# Patient Record
Sex: MALE | Race: WHITE | Employment: PART TIME | URBAN - METROPOLITAN AREA
[De-identification: names, ages, dates, MRNs, and addresses within clinical notes are randomized per-mention and may not be internally consistent; named-entity substitution may affect disease eponyms.]

---

## 2017-04-18 ENCOUNTER — HOSPITAL ENCOUNTER (OUTPATIENT)
Dept: LAB | Age: 62
Discharge: HOME | End: 2017-04-18
Attending: INTERNAL MEDICINE
Payer: COMMERCIAL

## 2017-04-18 DIAGNOSIS — I25.110 CORONARY ARTERY DISEASE INVOLVING NATIVE CORONARY ARTERY OF NATIVE HEART WITH UNSTABLE ANGINA PECTORIS (HCC): Chronic | ICD-10-CM

## 2017-04-18 LAB
ANION GAP BLD CALC-SCNC: 5 MMOL/L
BASOPHILS # BLD AUTO: 0 K/UL (ref 0–0.2)
BASOPHILS # BLD: 0 % (ref 0–2)
BUN SERPL-MCNC: 19 MG/DL (ref 8–23)
CALCIUM SERPL-MCNC: 8.9 MG/DL (ref 8.3–10.4)
CHLORIDE SERPL-SCNC: 105 MMOL/L (ref 98–107)
CO2 SERPL-SCNC: 32 MMOL/L (ref 21–32)
CREAT SERPL-MCNC: 1.2 MG/DL (ref 0.8–1.5)
DIFFERENTIAL METHOD BLD: ABNORMAL
EOSINOPHIL # BLD: 0.1 K/UL (ref 0–0.8)
EOSINOPHIL NFR BLD: 2 % (ref 0.5–7.8)
ERYTHROCYTE [DISTWIDTH] IN BLOOD BY AUTOMATED COUNT: 12.7 % (ref 11.9–14.6)
GLUCOSE SERPL-MCNC: 94 MG/DL (ref 65–100)
HCT VFR BLD AUTO: 40.9 % (ref 41.1–50.3)
HGB BLD-MCNC: 13.5 G/DL (ref 13.6–17.2)
LYMPHOCYTES # BLD AUTO: 23 % (ref 13–44)
LYMPHOCYTES # BLD: 1.4 K/UL (ref 0.5–4.6)
MCH RBC QN AUTO: 30.4 PG (ref 26.1–32.9)
MCHC RBC AUTO-ENTMCNC: 33 G/DL (ref 31.4–35)
MCV RBC AUTO: 92.1 FL (ref 79.6–97.8)
MONOCYTES # BLD: 0.6 K/UL (ref 0.1–1.3)
MONOCYTES NFR BLD AUTO: 9 % (ref 4–12)
NEUTS SEG # BLD: 4 K/UL (ref 1.7–8.2)
NEUTS SEG NFR BLD AUTO: 66 % (ref 43–78)
PLATELET # BLD AUTO: 152 K/UL (ref 150–450)
PMV BLD AUTO: 9.5 FL (ref 10.8–14.1)
POTASSIUM SERPL-SCNC: 4.2 MMOL/L (ref 3.5–5.1)
RBC # BLD AUTO: 4.44 M/UL (ref 4.23–5.67)
SODIUM SERPL-SCNC: 142 MMOL/L (ref 136–145)
WBC # BLD AUTO: 6.1 K/UL (ref 4.3–11.1)

## 2017-04-18 PROCEDURE — 36415 COLL VENOUS BLD VENIPUNCTURE: CPT | Performed by: INTERNAL MEDICINE

## 2017-04-18 PROCEDURE — 85025 COMPLETE CBC W/AUTO DIFF WBC: CPT | Performed by: INTERNAL MEDICINE

## 2017-04-18 PROCEDURE — 80048 BASIC METABOLIC PNL TOTAL CA: CPT | Performed by: INTERNAL MEDICINE

## 2017-04-26 NOTE — PROGRESS NOTES
Patient pre-assessment complete for [facility] poss with DR Nicole Fabian scheduled for 17 at 8am, arrival time 6am. Patient verified using . Patient instructed to bring all home medications in labeled bottles on the day of procedure. NPO status reinforced. Patient informed to take a full dose aspirin 325mg  or 81 mg x 4 & Effient 10 mg on the day of procedure. Instructed they can take all other medications excluding vitamins & supplements. Patient verbalizes understanding of all instructions & denies any questions at this time.

## 2017-04-27 ENCOUNTER — HOSPITAL ENCOUNTER (OUTPATIENT)
Dept: CARDIAC CATH/INVASIVE PROCEDURES | Age: 62
Discharge: HOME | End: 2017-04-27
Attending: INTERNAL MEDICINE | Admitting: INTERNAL MEDICINE
Payer: COMMERCIAL

## 2017-04-27 VITALS
WEIGHT: 220 LBS | OXYGEN SATURATION: 96 % | HEART RATE: 63 BPM | RESPIRATION RATE: 16 BRPM | SYSTOLIC BLOOD PRESSURE: 116 MMHG | TEMPERATURE: 98.4 F | BODY MASS INDEX: 29.8 KG/M2 | DIASTOLIC BLOOD PRESSURE: 59 MMHG | HEIGHT: 72 IN

## 2017-04-27 PROCEDURE — 99152 MOD SED SAME PHYS/QHP 5/>YRS: CPT

## 2017-04-27 PROCEDURE — C1894 INTRO/SHEATH, NON-LASER: HCPCS

## 2017-04-27 PROCEDURE — 77030004559 HC CATH ANGI DX SUPT CARD -B

## 2017-04-27 PROCEDURE — 74011250636 HC RX REV CODE- 250/636: Performed by: INTERNAL MEDICINE

## 2017-04-27 PROCEDURE — 77030029997 HC DEV COM RDL R BND TELE -B

## 2017-04-27 PROCEDURE — 93458 L HRT ARTERY/VENTRICLE ANGIO: CPT

## 2017-04-27 PROCEDURE — 74011250636 HC RX REV CODE- 250/636

## 2017-04-27 PROCEDURE — 74011000250 HC RX REV CODE- 250: Performed by: INTERNAL MEDICINE

## 2017-04-27 PROCEDURE — 74011636320 HC RX REV CODE- 636/320: Performed by: INTERNAL MEDICINE

## 2017-04-27 PROCEDURE — 99153 MOD SED SAME PHYS/QHP EA: CPT

## 2017-04-27 PROCEDURE — C1769 GUIDE WIRE: HCPCS

## 2017-04-27 RX ORDER — LIDOCAINE HYDROCHLORIDE 20 MG/ML
1-20 INJECTION, SOLUTION INFILTRATION; PERINEURAL
Status: DISCONTINUED | OUTPATIENT
Start: 2017-04-27 | End: 2017-04-27 | Stop reason: HOSPADM

## 2017-04-27 RX ORDER — GUAIFENESIN 100 MG/5ML
81-324 LIQUID (ML) ORAL ONCE
Status: DISCONTINUED | OUTPATIENT
Start: 2017-04-27 | End: 2017-04-27 | Stop reason: HOSPADM

## 2017-04-27 RX ORDER — FENTANYL CITRATE 50 UG/ML
25-100 INJECTION, SOLUTION INTRAMUSCULAR; INTRAVENOUS
Status: DISCONTINUED | OUTPATIENT
Start: 2017-04-27 | End: 2017-04-27 | Stop reason: HOSPADM

## 2017-04-27 RX ORDER — SODIUM CHLORIDE 9 MG/ML
75 INJECTION, SOLUTION INTRAVENOUS CONTINUOUS
Status: DISCONTINUED | OUTPATIENT
Start: 2017-04-27 | End: 2017-04-27 | Stop reason: HOSPADM

## 2017-04-27 RX ORDER — MIDAZOLAM HYDROCHLORIDE 1 MG/ML
.5-5 INJECTION, SOLUTION INTRAMUSCULAR; INTRAVENOUS
Status: DISCONTINUED | OUTPATIENT
Start: 2017-04-27 | End: 2017-04-27 | Stop reason: HOSPADM

## 2017-04-27 RX ORDER — METOPROLOL SUCCINATE 50 MG/1
100 TABLET, EXTENDED RELEASE ORAL DAILY
Qty: 30 TAB | Refills: 5 | Status: SHIPPED | OUTPATIENT
Start: 2017-04-27 | End: 2017-05-10

## 2017-04-27 RX ORDER — HEPARIN SODIUM 200 [USP'U]/100ML
3 INJECTION, SOLUTION INTRAVENOUS CONTINUOUS
Status: DISCONTINUED | OUTPATIENT
Start: 2017-04-27 | End: 2017-04-27 | Stop reason: HOSPADM

## 2017-04-27 RX ORDER — DIAZEPAM 5 MG/1
5 TABLET ORAL ONCE
Status: DISCONTINUED | OUTPATIENT
Start: 2017-04-27 | End: 2017-04-27 | Stop reason: HOSPADM

## 2017-04-27 RX ADMIN — LIDOCAINE HYDROCHLORIDE 60 MG: 20 INJECTION, SOLUTION INFILTRATION; PERINEURAL at 08:58

## 2017-04-27 RX ADMIN — SODIUM CHLORIDE 75 ML/HR: 900 INJECTION, SOLUTION INTRAVENOUS at 07:13

## 2017-04-27 RX ADMIN — MIDAZOLAM HYDROCHLORIDE 2 MG: 1 INJECTION, SOLUTION INTRAMUSCULAR; INTRAVENOUS at 08:57

## 2017-04-27 RX ADMIN — IOPAMIDOL 105 ML: 755 INJECTION, SOLUTION INTRAVENOUS at 09:16

## 2017-04-27 RX ADMIN — HEPARIN SODIUM 3 ML/HR: 200 INJECTION, SOLUTION INTRAVENOUS at 08:07

## 2017-04-27 RX ADMIN — HEPARIN SODIUM 2 ML: 10000 INJECTION, SOLUTION INTRAVENOUS; SUBCUTANEOUS at 09:00

## 2017-04-27 NOTE — PROGRESS NOTES
Patient received to [address] [place] [address] room # 3  Ambulatory from [facility]. Patient scheduled for [facility] today with Dr Mikel Wharton. Procedure reviewed & questions answered, voiced good understanding consent obtained & placed on chart. All medications and medical history reviewed. Will prep patient per orders. Patient & family updated on plan of care.

## 2017-04-27 NOTE — PROCEDURES
Brief Cardiac Procedure Note    Patient: Aaron Lindsay MRN: [id number]  SSN: [id number]    YOB: 1955  Age: 64 y.o. Sex: male      Date of Procedure: 4/27/2017     Pre-procedure Diagnosis: Chest pain CCS Class III    Post-procedure Diagnosis: Coronary Artery Disease    Procedure: Left Heart Catheterization    Brief Description of Procedure: viarra    Performed By: Skyla Mosley MD     Assistants:     Anesthesia: Moderate Sedation    Estimated Blood Loss: Less than 10 mL      Specimens: None    Implants: None    Findings: nml lv, cors ok    Complications: None    Recommendations: Continue medical therapy.     Signed By: Skyla Mosley MD     April 27, 2017

## 2017-04-27 NOTE — PROGRESS NOTES
TRANSFER - OUT REPORT:    Verbal report given to del rn(name) on [place] Blocker  being transferred to cpru(unit) for routine progression of care       Report consisted of patients Situation, Background, Assessment and   Recommendations(SBAR). Information from the following report(s) SBAR was reviewed with the receiving nurse. Lines:   Peripheral IV 04/27/17 Right Antecubital (Active)       Peripheral IV 04/27/17 Left Antecubital (Active)        Opportunity for questions and clarification was provided.       Patient transported with:   Luke Lamar  No intervention  Right wrist r band 10ml  No bleeding or hematoma  Versed 2mg

## 2017-04-27 NOTE — IP AVS SNAPSHOT
[address] 
[phone] Patient: Gi Aguilar MRN: [id number] DCA:27/46/7462 Discharge Summary 4/27/2017 Gi Aguilar MRN[de-identified]  [id number] Admission Information Provider Pager Service Admission Date Expected D/C Date Kimberlee Mccarthy MD  CARDIAC CATH LAB 4/27/2017 4/27/2017 Actual LOS Patient Class 0 days OUTPATIENT Follow-up Information Follow up With Details Comments Contact Info Eleni Daniel MD    [place]  
[address] [facility] [address] 
[phone] Current Discharge Medication List  
  
CONTINUE these medications which have CHANGED Dose & Instructions Dispensing Information Comments Morning Noon Evening Bedtime  
 metoprolol succinate 50 mg XL tablet Commonly known as:  TOPROL-XL What changed:  how much to take Your last dose was: Your next dose is:    
   
   
 Dose:  100 mg Take 2 Tabs by mouth daily. Indications: hypertension Quantity:  30 Tab Refills:  5 CONTINUE these medications which have NOT CHANGED Dose & Instructions Dispensing Information Comments Morning Noon Evening Bedtime  
 aspirin delayed-release 81 mg tablet Your last dose was: Your next dose is:    
   
   
 Dose:  81 mg Take 1 Tab by mouth daily. Quantity:  30 Tab Refills:  3  
     
   
   
   
  
 citalopram 40 mg tablet Commonly known as:  Nakul Dumont Your last dose was: Your next dose is:    
   
   
 Dose:  40 mg Take 1 Tab by mouth daily. Indications: ANXIETY WITH DEPRESSION Quantity:  30 Tab Refills:  5  
     
   
   
   
  
 fexofenadine 180 mg tablet Commonly known as:  Marianna Lopez Your last dose was: Your next dose is: Take  by mouth as needed. Refills:  0 Lisdexamfetamine 40 mg capsule Commonly known as:  VYVANSE Your last dose was: Your next dose is:    
   
   
 Dose:  40 mg Take 1 Cap by mouth daily. Max Daily Amount: 40 mg. Indications: ATTENTION-DEFICIT HYPERACTIVITY DISORDER Quantity:  30 Cap Refills:  0  
     
   
   
   
  
 lisinopril 20 mg tablet Commonly known as:  Nona Alegria Your last dose was: Your next dose is:    
   
   
 Dose:  20 mg Take 1 Tab by mouth daily. Indications: HYPERTENSION Quantity:  30 Tab Refills:  5  
 lisinopril refill(s) sent with 1 month supply only. niacin 1,000 mg Tb24 tab Commonly known as:  Willena [place] Your last dose was: Your next dose is:    
   
   
 Dose:  2000 mg Take 2 Tabs by mouth nightly. Quantity:  60 Tab Refills:  5  
     
   
   
   
  
 pantoprazole 40 mg tablet Commonly known as:  PROTONIX Your last dose was: Your next dose is:    
   
   
 Dose:  40 mg Take 1 Tab by mouth daily. Quantity:  30 Tab Refills:  5  
     
   
   
   
  
 prasugrel 10 mg tablet Commonly known as:  EFFIENT Your last dose was: Your next dose is:    
   
   
 Dose:  10 mg Take 1 Tab by mouth daily. Quantity:  30 Tab Refills:  11  
     
   
   
   
  
 rosuvastatin 20 mg tablet Commonly known as:  CRESTOR Your last dose was: Your next dose is:    
   
   
 Dose:  20 mg Take 1 Tab by mouth daily. Quantity:  30 Tab Refills:  5  
     
   
   
   
  
 sildenafil citrate 100 mg tablet Commonly known as:  VIAGRA Your last dose was: Your next dose is:    
   
   
 Dose:  100 mg Take 1 Tab by mouth as needed. Quantity:  10 Tab Refills:  11  
     
   
   
   
  
 tamsulosin 0.4 mg capsule Commonly known as:  FLOMAX Your last dose was: Your next dose is:    
   
   
 Dose:  0.4 mg Take 1 Cap by mouth daily. Quantity:  30 Cap Refills:  5 VITAMIN D3 PO Your last dose was: Your next dose is: Take  by mouth daily. Refills:  0 Where to Get Your Medications These medications [facility], [address] [place] [address] [address], [address] [place] [address] [address] [address] Phone:  [phone]  
  metoprolol succinate 50 mg XL tablet General Information Please provide this summary of care documentation to your next provider. Allergies Unspecified:  Codeine Current Immunizations  Never Reviewed Name Date Influenza Vaccine 11/3/2015 Pneumococcal Vaccine (Unspecified Type) 11/3/2015 Discharge Instructions Discharge Instructions HEART CATHETERIZATION/ANGIOGRAPHY DISCHARGE INSTRUCTIONS Follow-up appointment: May 10th @ 9:30am with Dr. Anselmo Huizar You are to increase your Toprol from 25mg to 50mg 1. Check puncture site frequently for swelling or bleeding. If there is any bleeding, lie down and apply pressure over the area with a clean towel or washcloth. Notify your doctor for any redness, swelling, drainage, or oozing from the puncture site. Notify your doctor for any fever or chills. 2. If the extremity becomes cold, numb, or painful call [address] [facility] [address] [address] Cardiology at [phone]. 
3. Activity should be limited for the next 48 hours. Climb stairs as little as possible and avoid any stooping, bending, or strenuous activity for 48 hours. No heavy lifting (anything over 10 pounds) for 3 days. 4. You may resume your usual diet. Drink more fluids than usual. 
5. Have a responsible person drive you home and stay with you for at least 24 hours after your heart catheterization/angiography. 6. You may remove bandage from your {ARM/GROIN:00202} in 24 hours. You may shower in 24 hours. No tub baths, hot tubs, or swimming for 1 week.  Do not place any lotions, creams, powders, or ointments over puncture site for 1 week. You may place a clean band-aid over the puncture site each day for 5 days. Change daily. I have read the above instructions and have had the opportunity to ask questions. Patient: ________________________   Date: 4/27/2017 Witness: _______________________   Date: 4/27/2017 Discharge Orders None  
  
` Patient Signature:  ____________________________________________________________ Date:  ____________________________________________________________  
  
 Neita Hidden Provider Signature:  ____________________________________________________________ Date:  ____________________________________________________________

## 2017-04-27 NOTE — DISCHARGE INSTRUCTIONS
HEART CATHETERIZATION/ANGIOGRAPHY DISCHARGE INSTRUCTIONS  Follow-up appointment: May 10th @ 9:30am with Dr. Martina Corrales are to increase your Toprol from 25mg to 50mg    1. Check puncture site frequently for swelling or bleeding. If there is any bleeding, lie down and apply pressure over the area with a clean towel or washcloth. Notify your doctor for any redness, swelling, drainage, or oozing from the puncture site. Notify your doctor for any fever or chills. 2. If the extremity becomes cold, numb, or painful call [facility] Cardiology at [phone].  3. Activity should be limited for the next 48 hours. Climb stairs as little as possible and avoid any stooping, bending, or strenuous activity for 48 hours. No heavy lifting (anything over 10 pounds) for 3 days. 4. You may resume your usual diet. Drink more fluids than usual.  5. Have a responsible person drive you home and stay with you for at least 24 hours after your heart catheterization/angiography. 6. You may remove bandage from your {ARM/GROIN:31096} in 24 hours. You may shower in 24 hours. No tub baths, hot tubs, or swimming for 1 week. Do not place any lotions, creams, powders, or ointments over puncture site for 1 week. You may place a clean band-aid over the puncture site each day for 5 days. Change daily. I have read the above instructions and have had the opportunity to ask questions.       Patient: ________________________   Date: 4/27/2017    Witness: _______________________   Date: 4/27/2017

## 2017-04-27 NOTE — PROGRESS NOTES
Report received from [address] [place] [address]. Procedural findings communicated. Intra procedural  medication administration reviewed. Progression of care discussed.      Patient received into [address] [facility] 4 post sheath removal.     Access site without bleeding or swelling yes    Dressing dry and intact yes    Patient instructed to limit movement to right upper extremity    Routine post procedural vital signs and site assessment initiated yes

## 2017-04-27 NOTE — PROGRESS NOTES
R band deflation completed. Right radial dressed with 4x4 and tegaderm using sterile technique. No bleeding or hematoma. Tolerated without difficulty. Discharge instructions given. Right wrist dsg intact with wrist site w/o bleeding or swelling.

## 2017-04-27 NOTE — PROCEDURES
[address]       Name:  Raheem Bonilla   MR#:  [id number]   :  1955   Account #:  [de-identified]   Date of Adm:  2017       DATE OF PROCEDURE: 2017    PROCEDURE PERFORMED: Cardiac catheterization. HISTORY: This is a middle-aged gentleman with hypertrophic   cardiomyopathy and coronary artery disease. He has undergone   prior alcohol septal ablation and coronary artery stenting. He   has had recurrent [place] class 3 angina pectoris. A cardiac   catheterization is recommended. PROCEDURE: Left heart catheterization, left ventriculography,   and coronary angiography is carried out from the right radial   artery by modified Seldinger technique with a 6-Malagasy   multipurpose and 5-Malagasy Tiger catheter. No intervention was   performed. FINDINGS:   The central aortic pressure is 120/70 mmHg. Left ventricular   end-diastolic pressure is 12 mmHg. There is a 60 mm left   ventricular gradient after a PVC. The overall left ventricular size is normal. The wall motion is   normal. Ejection fraction is 65%. Coronary angiography reveals a normal left main. It divides into   an LAD and left circumflex. The LAD has been previously stented in its proximal segment with   no restenosis. There is a jailed small diagonal branch which is   patent with JAIR-3 flow. The middle and distal LAD segments have   atherosclerotic irregularity with no significant stenosis   appreciated. The ramus intermedius branch of the left coronary artery has   been previously stented with no restenosis. This supplies the   obtuse marginal circulation. It is a large vessel. There is   minor irregularity with no significant stenosis. The true left circumflex is a small vessel with minor disease   and a small distal vessel. The right coronary artery is a large vessel. There is minor   irregularity in the proximal segment.  The middle portion has   been previously stented with no restenosis. The distal segment   is diffusely irregular with atherosclerosis with no significant   stenosis appreciated. IMPRESSION:   1. Normal left ventricular function. 2. Normal left ventricular diastolic pressure. 3. Post pvc left ventricular gradient   consistent with diagnosis of  hypertrophic obstructive   cardiomyopathy. 4. Diffuse coronary artery disease as described. The left   anterior descending, ramus, and right coronary arteries have   been previously stented with no restenosis. There are diffuse   atherosclerotic changes noted. RECOMMENDATIONS: Medical therapy.         MD Wing Magdalena Chamorro / [place]   D:  04/27/2017   09:20   T:  04/27/2017   09:57   Job #:  [id number]

## 2018-01-02 PROBLEM — N40.1 BENIGN PROSTATIC HYPERPLASIA WITH INCOMPLETE BLADDER EMPTYING: Status: ACTIVE | Noted: 2018-01-02

## 2018-01-02 PROBLEM — F98.8 ATTENTION DEFICIT DISORDER (ADD) WITHOUT HYPERACTIVITY: Status: ACTIVE | Noted: 2018-01-02

## 2018-01-02 PROBLEM — R39.14 BENIGN PROSTATIC HYPERPLASIA WITH INCOMPLETE BLADDER EMPTYING: Status: ACTIVE | Noted: 2018-01-02

## 2018-07-16 PROBLEM — E78.5 DYSLIPIDEMIA: Status: ACTIVE | Noted: 2018-07-16

## 2019-06-10 ENCOUNTER — HOSPITAL ENCOUNTER (OUTPATIENT)
Dept: LAB | Age: 64
Discharge: HOME | End: 2019-06-10
Attending: INTERNAL MEDICINE
Payer: COMMERCIAL

## 2019-06-10 DIAGNOSIS — E78.5 DYSLIPIDEMIA: ICD-10-CM

## 2019-06-10 LAB
CHOLEST SERPL-MCNC: 90 MG/DL
HDLC SERPL-MCNC: 55 MG/DL (ref 40–60)
HDLC SERPL: 1.6 {RATIO}
LDLC SERPL CALC-MCNC: 25.6 MG/DL
LIPID PROFILE,FLP: NORMAL
TRIGL SERPL-MCNC: 47 MG/DL (ref 35–150)
VLDLC SERPL CALC-MCNC: 9.4 MG/DL (ref 6–23)

## 2019-06-10 PROCEDURE — 36415 COLL VENOUS BLD VENIPUNCTURE: CPT

## 2019-06-10 PROCEDURE — 80061 LIPID PANEL: CPT

## 2019-07-30 ENCOUNTER — HOSPITAL ENCOUNTER (OUTPATIENT)
Dept: CARDIAC CATH/INVASIVE PROCEDURES | Age: 64
Discharge: HOME | End: 2019-07-30
Attending: INTERNAL MEDICINE | Admitting: INTERNAL MEDICINE
Payer: COMMERCIAL

## 2019-07-30 VITALS
WEIGHT: 220 LBS | BODY MASS INDEX: 29.8 KG/M2 | HEIGHT: 72 IN | SYSTOLIC BLOOD PRESSURE: 124 MMHG | DIASTOLIC BLOOD PRESSURE: 64 MMHG | RESPIRATION RATE: 16 BRPM | OXYGEN SATURATION: 99 % | HEART RATE: 56 BPM

## 2019-07-30 LAB
ANION GAP SERPL CALC-SCNC: 5 MMOL/L (ref 7–16)
ATRIAL RATE: 49 BPM
BUN SERPL-MCNC: 18 MG/DL (ref 8–23)
CALCIUM SERPL-MCNC: 8.6 MG/DL (ref 8.3–10.4)
CALCULATED P AXIS, ECG09: 58 DEGREES
CALCULATED R AXIS, ECG10: 68 DEGREES
CALCULATED T AXIS, ECG11: 63 DEGREES
CHLORIDE SERPL-SCNC: 107 MMOL/L (ref 98–107)
CO2 SERPL-SCNC: 30 MMOL/L (ref 21–32)
CREAT SERPL-MCNC: 1.05 MG/DL (ref 0.8–1.5)
DIAGNOSIS, 93000: NORMAL
ERYTHROCYTE [DISTWIDTH] IN BLOOD BY AUTOMATED COUNT: 12.9 % (ref 11.9–14.6)
GLUCOSE SERPL-MCNC: 90 MG/DL (ref 65–100)
HCT VFR BLD AUTO: 41.1 % (ref 41.1–50.3)
HGB BLD-MCNC: 13.5 G/DL (ref 13.6–17.2)
INR PPP: 0.9
MCH RBC QN AUTO: 30.8 PG (ref 26.1–32.9)
MCHC RBC AUTO-ENTMCNC: 32.8 G/DL (ref 31.4–35)
MCV RBC AUTO: 93.8 FL (ref 79.6–97.8)
NRBC # BLD: 0 K/UL (ref 0–0.2)
P-R INTERVAL, ECG05: 174 MS
PLATELET # BLD AUTO: 134 K/UL (ref 150–450)
PMV BLD AUTO: 10.2 FL (ref 9.4–12.3)
POTASSIUM SERPL-SCNC: 4.2 MMOL/L (ref 3.5–5.1)
PROTHROMBIN TIME: 12.5 SEC (ref 11.7–14.5)
Q-T INTERVAL, ECG07: 474 MS
QRS DURATION, ECG06: 86 MS
QTC CALCULATION (BEZET), ECG08: 428 MS
RBC # BLD AUTO: 4.38 M/UL (ref 4.23–5.6)
SODIUM SERPL-SCNC: 142 MMOL/L (ref 136–145)
VENTRICULAR RATE, ECG03: 49 BPM
WBC # BLD AUTO: 5.6 K/UL (ref 4.3–11.1)

## 2019-07-30 PROCEDURE — 77030029997 HC DEV COM RDL R BND TELE -B

## 2019-07-30 PROCEDURE — 77030013687 HC GD NDL BARD -B

## 2019-07-30 PROCEDURE — C1760 CLOSURE DEV, VASC: HCPCS

## 2019-07-30 PROCEDURE — C1894 INTRO/SHEATH, NON-LASER: HCPCS

## 2019-07-30 PROCEDURE — 93005 ELECTROCARDIOGRAM TRACING: CPT | Performed by: INTERNAL MEDICINE

## 2019-07-30 PROCEDURE — 77030004559 HC CATH ANGI DX SUPT CARD -B

## 2019-07-30 PROCEDURE — 85610 PROTHROMBIN TIME: CPT

## 2019-07-30 PROCEDURE — 74011250636 HC RX REV CODE- 250/636

## 2019-07-30 PROCEDURE — C1769 GUIDE WIRE: HCPCS

## 2019-07-30 PROCEDURE — 74011000250 HC RX REV CODE- 250: Performed by: INTERNAL MEDICINE

## 2019-07-30 PROCEDURE — 85027 COMPLETE CBC AUTOMATED: CPT

## 2019-07-30 PROCEDURE — 93460 R&L HRT ART/VENTRICLE ANGIO: CPT

## 2019-07-30 PROCEDURE — 99153 MOD SED SAME PHYS/QHP EA: CPT

## 2019-07-30 PROCEDURE — C1751 CATH, INF, PER/CENT/MIDLINE: HCPCS

## 2019-07-30 PROCEDURE — 80048 BASIC METABOLIC PNL TOTAL CA: CPT

## 2019-07-30 PROCEDURE — 74011636320 HC RX REV CODE- 636/320: Performed by: INTERNAL MEDICINE

## 2019-07-30 PROCEDURE — 99152 MOD SED SAME PHYS/QHP 5/>YRS: CPT

## 2019-07-30 PROCEDURE — 74011250636 HC RX REV CODE- 250/636: Performed by: INTERNAL MEDICINE

## 2019-07-30 RX ORDER — GUAIFENESIN 100 MG/5ML
81-324 LIQUID (ML) ORAL ONCE
Status: DISCONTINUED | OUTPATIENT
Start: 2019-07-30 | End: 2019-07-30 | Stop reason: HOSPADM

## 2019-07-30 RX ORDER — SODIUM CHLORIDE 9 MG/ML
75 INJECTION, SOLUTION INTRAVENOUS CONTINUOUS
Status: DISCONTINUED | OUTPATIENT
Start: 2019-07-30 | End: 2019-07-30 | Stop reason: HOSPADM

## 2019-07-30 RX ORDER — LIDOCAINE HYDROCHLORIDE 10 MG/ML
3-10 INJECTION INFILTRATION; PERINEURAL
Status: DISCONTINUED | OUTPATIENT
Start: 2019-07-30 | End: 2019-07-30 | Stop reason: HOSPADM

## 2019-07-30 RX ORDER — DIAZEPAM 5 MG/1
5 TABLET ORAL ONCE
Status: DISCONTINUED | OUTPATIENT
Start: 2019-07-30 | End: 2019-07-30 | Stop reason: HOSPADM

## 2019-07-30 RX ORDER — MIDAZOLAM HYDROCHLORIDE 1 MG/ML
.5-2 INJECTION, SOLUTION INTRAMUSCULAR; INTRAVENOUS
Status: DISCONTINUED | OUTPATIENT
Start: 2019-07-30 | End: 2019-07-30 | Stop reason: HOSPADM

## 2019-07-30 RX ORDER — HEPARIN SODIUM 200 [USP'U]/100ML
2 INJECTION, SOLUTION INTRAVENOUS CONTINUOUS
Status: DISCONTINUED | OUTPATIENT
Start: 2019-07-30 | End: 2019-07-30 | Stop reason: HOSPADM

## 2019-07-30 RX ADMIN — HEPARIN SODIUM 2 UNITS/HR: 5000 INJECTION, SOLUTION INTRAVENOUS; SUBCUTANEOUS at 13:13

## 2019-07-30 RX ADMIN — MIDAZOLAM HYDROCHLORIDE 2 MG: 1 INJECTION, SOLUTION INTRAMUSCULAR; INTRAVENOUS at 13:40

## 2019-07-30 RX ADMIN — LIDOCAINE HYDROCHLORIDE 3 ML: 10 INJECTION, SOLUTION INFILTRATION; PERINEURAL at 13:34

## 2019-07-30 RX ADMIN — MIDAZOLAM HYDROCHLORIDE 2 MG: 1 INJECTION, SOLUTION INTRAMUSCULAR; INTRAVENOUS at 13:25

## 2019-07-30 RX ADMIN — IOPAMIDOL 120 ML: 755 INJECTION, SOLUTION INTRAVENOUS at 14:13

## 2019-07-30 RX ADMIN — LIDOCAINE HYDROCHLORIDE 3 ML: 10 INJECTION, SOLUTION INFILTRATION; PERINEURAL at 14:00

## 2019-07-30 RX ADMIN — HEPARIN SODIUM 2 ML: 10000 INJECTION, SOLUTION INTRAVENOUS; SUBCUTANEOUS at 14:01

## 2019-07-30 RX ADMIN — SODIUM CHLORIDE 75 ML/HR: 900 INJECTION, SOLUTION INTRAVENOUS at 11:40

## 2019-07-30 RX ADMIN — MIDAZOLAM HYDROCHLORIDE 2 MG: 1 INJECTION, SOLUTION INTRAMUSCULAR; INTRAVENOUS at 13:50

## 2019-07-30 NOTE — PROGRESS NOTES
Report received from [facility] Lab RN. Procedural findings communicated. Intra procedural  medication administration reviewed. Progression of care discussed.      Patient received into [address] [facility] 7 post sheath removal.     Access site without bleeding or swelling yes    Dressing dry and intact yes    Patient instructed to limit movement to right upper and lower extremity    Routine post procedural vital signs and site assessment initiated yes

## 2019-07-30 NOTE — PROCEDURES
Brief Cardiac Procedure Note    Patient: Jennifer Caal MRN: [id number]  SSN: [id number]    YOB: 1955  Age: 61 y.o. Sex: male      Date of Procedure: 7/30/2019     Pre-procedure Diagnosis: Typical Angina    Post-procedure Diagnosis: Coronary Artery Disease    Reason for Procedure: Worsening Angina    Procedure: Right and Left Heart Catheterization with Percutaneous Coronary Intervention    Brief Description of Procedure: rra and rfv    Performed By: Wojciech Daley MD     Assistants:     Anesthesia: Moderate Sedation    Estimated Blood Loss: Less than 10 mL      Specimens: None    Implants: None    Findings:   100 mm [place]  lv ok  Cors ok    Complications: None    Recommendations:Continue medical therapy.     Signed By: Wojciech Daley MD     July 30, 2019

## 2019-07-30 NOTE — DISCHARGE INSTRUCTIONS
HEART CATHETERIZATION/ANGIOGRAPHY DISCHARGE INSTRUCTIONS    1. Check puncture site frequently for swelling or bleeding. If there is any bleeding, lie down and apply pressure over the area with a clean towel or washcloth. Notify your doctor for any redness, swelling, drainage, or oozing from the puncture site. Notify your doctor for any fever or chills. 2. If the extremity becomes cold, numb, or painful call [facility] Cardiology at [phone].  3. Activity should be limited for the next 48 hours. Climb stairs as little as possible and avoid any stooping, bending, or strenuous activity for 48 hours. No heavy lifting (anything over 10 pounds) for 3 days. 4. You may resume your usual diet. Drink more fluids than usual.  5. Have a responsible person drive you home and stay with you for at least 24 hours after your heart catheterization/angiography. 6. You may remove bandage from your Right wrist and groin in 24 hours. You may shower in 24 hours. No tub baths, hot tubs, or swimming for 1 week. Do not place any lotions, creams, powders, or ointments over puncture site for 1 week. You may place a clean band-aid over the puncture site each day for 5 days. Change daily. I have read the above instructions and have had the opportunity to ask questions.       Patient: ________________________   Date: 7/30/2019    Witness: _______________________   Date: 7/30/2019

## 2019-07-30 NOTE — PROGRESS NOTES
Patient pre-assessment complete for [facility] poss with DR Db Reeder scheduled for 19 at 12:30pm, arrival time 10:30pm. Patient verified using . Patient instructed to bring all home medications in labeled bottles on the day of procedure. NPO status reinforced. Patient informed to take a full dose aspirin 325mg  or 81 mg x 4 on the day of procedure. . Instructed they can take all other medications excluding vitamins & supplements. Patient verbalizes understanding of all instructions & denies any questions at this time.

## 2019-07-30 NOTE — PROGRESS NOTES
TRANSFER - OUT REPORT:    R/[facility] Dr Geovany Carranza  RBV unsuccessful  RFV closed with perclose  RRA band 12 ml  Diagnostic no interventions  Versed 6 mg  Pt is a/o denies complaints  No bleeding/hematoma at either site    Verbal report given to Edwina(name) on [address]  being transferred to cpru(unit) for routine progression of care       Report consisted of patients Situation, Background, Assessment and   Recommendations(SBAR). Information from the following report(s) SBAR and Procedure Summary was reviewed with the receiving nurse. Lines:   Peripheral IV 07/30/19 Right Antecubital (Active)        Opportunity for questions and clarification was provided.

## 2019-07-30 NOTE — PROGRESS NOTES
Patient received to [address] [place] [address] room # 10  Ambulatory from [facility]. Patient scheduled for [facility] today with Dr John Lucio. Procedure reviewed & questions answered, voiced good understanding consent obtained & placed on chart. All medications and medical history reviewed. Will prep patient per orders. Patient & family updated on plan of care. The patient has a fraility score of 3-MANAGING WELL, based on patient A&Ox3, patient able to ambulate to room without difficulty.

## 2019-07-30 NOTE — PROGRESS NOTES
Discharge instructions given. TR band removed from right wrist. 4x4 gauze dsg applied with tegaderm. Site w/o bleeding or swelling. Wife at bedside.

## 2019-07-31 NOTE — PROCEDURES
[facility]  CARDIAC CATH    Name:  Makenzie Rodriguez  MR#:  [id number]  :  1955  ACCOUNT #:  [de-identified]  DATE OF SERVICE:  2019    PROCEDURES PERFORMED:  Right and left heart catheterization, left ventriculography, and coronary angiography. PREOPERATIVE DIAGNOSES:  Hypertrophic cardiomyopathy, coronary artery disease, and worsening angina. POSTOPERATIVE DIAGNOSES:  Hypertrophic cardiomyopathy, coronary artery disease, and worsening angina. SURGEON:  Alfreda Restrepo MD    ASSISTANT:  None. ESTIMATED BLOOD LOSS:  None. SPECIMENS REMOVED:  None. COMPLICATIONS:  0    IMPLANTS:  Perclose, right femoral vein. ANESTHESIA:  Conscious sedation. HISTORY:  This is a 59-year-old gentleman with hypertrophic cardiomyopathy. He has had prior alcohol septal ablation. He also has coronary artery disease and has had prior PCI. He has had problems with worsening angina in spite of medical therapy. Cardiac catheterization is recommended. PROCEDURE:  Right heart catheterization was performed from the right common femoral vein. A right upper extremity approach was not feasible. A 7-[place] catheter was utilized. Left heart catheterization with left ventriculography and coronary angiography was then performed from the right radial artery with a 6-C[place]an multipurpose. He tolerated the procedure well. No intervention was performed. FINDINGS:  His pulmonary artery pressure was 25/10 with a mean of 13 mmHg. The mean pulmonary capillary wedge pressure was 8 mmHg. His left ventricular pressure was 170/18 mmHg with a pre-A value of 10 mmHg. There was no gradient across the aortic valve. After a PVC, there was a gradient on the order of 100 mmHg. Left ventriculography reveals hyperdynamic left ventricular function. There is some mitral regurgitation which is felt to be catheter induced. Ejection fraction is 65%. Coronary angiography reveals a normal left main. It divides into an LAD, ramus intermedius, and left circumflex. The LAD has been stented in its proximal segment with no restenosis. The further middle and distal portion of the LAD showed no obstruction. The ramus intermedius is a large vessel. It has minor disease. The left circumflex is a relatively small vessel. There is a smooth focal 50% to 60% stenosis prior to a stent that shows no restenosis. The right coronary artery is a big vessel. There is a stent in the midportion with no restenosis. There is a stent in the distal segment with mild restenosis. IMPRESSION:  1. Hyperdynamic left ventricular function. 2.  Hemodynamic evidence of hypertrophic left ventricular outflow tract obstruction as evidenced by a large gradient post premature ventricular contraction. 3.  Normal pulmonary artery pressure. 3.  Coronary artery disease as described. The proximal left anterior descending has been stented with no restenosis. The small left circumflex has been stented with moderate focal proximal disease. The right coronary artery has been stented in its middle and distal segments with mild restenosis. RECOMMENDATIONS:  Continue medical therapy for his coronary artery disease. .  The patient is referred for repeat intervention for his septal obstruction.       Liu Osorio MD      [id number]  D:  07/30/2019 14:37  T:  07/30/2019 14:45  JOB #:  [id number]

## 2021-04-08 ENCOUNTER — HOSPITAL ENCOUNTER (OUTPATIENT)
Dept: LAB | Age: 66
Discharge: HOME | End: 2021-04-08
Payer: MEDICARE

## 2021-04-08 DIAGNOSIS — I42.1 HYPERTROPHIC OBSTRUCTIVE CARDIOMYOPATHY (HCC): ICD-10-CM

## 2021-04-08 LAB
ALBUMIN SERPL-MCNC: 3.5 G/DL (ref 3.2–4.6)
ALBUMIN/GLOB SERPL: 1.2 {RATIO} (ref 1.2–3.5)
ALP SERPL-CCNC: 56 U/L (ref 50–136)
ALT SERPL-CCNC: 32 U/L (ref 12–65)
ANION GAP SERPL CALC-SCNC: ABNORMAL MMOL/L (ref 7–16)
AST SERPL-CCNC: 17 U/L (ref 15–37)
BASOPHILS # BLD: 0 K/UL (ref 0–0.2)
BASOPHILS NFR BLD: 0 % (ref 0–2)
BILIRUB SERPL-MCNC: 0.4 MG/DL (ref 0.2–1.1)
BNP SERPL-MCNC: 142 PG/ML (ref 5–125)
BUN SERPL-MCNC: 20 MG/DL (ref 8–23)
CALCIUM SERPL-MCNC: 8 MG/DL (ref 8.3–10.4)
CHLORIDE SERPL-SCNC: 109 MMOL/L (ref 98–107)
CHOLEST SERPL-MCNC: 131 MG/DL
CO2 SERPL-SCNC: 32 MMOL/L (ref 21–32)
CREAT SERPL-MCNC: 1.14 MG/DL (ref 0.8–1.5)
DIFFERENTIAL METHOD BLD: ABNORMAL
EOSINOPHIL # BLD: 0.1 K/UL (ref 0–0.8)
EOSINOPHIL NFR BLD: 2 % (ref 0.5–7.8)
ERYTHROCYTE [DISTWIDTH] IN BLOOD BY AUTOMATED COUNT: 13.5 % (ref 11.9–14.6)
GLOBULIN SER CALC-MCNC: 2.9 G/DL (ref 2.3–3.5)
GLUCOSE SERPL-MCNC: 93 MG/DL (ref 65–100)
HCT VFR BLD AUTO: 39.1 % (ref 41.1–50.3)
HDLC SERPL-MCNC: 48 MG/DL (ref 40–60)
HDLC SERPL: 2.7 {RATIO}
HGB BLD-MCNC: 12.6 G/DL (ref 13.6–17.2)
IMM GRANULOCYTES # BLD AUTO: 0 K/UL (ref 0–0.5)
IMM GRANULOCYTES NFR BLD AUTO: 0 % (ref 0–5)
LDLC SERPL CALC-MCNC: 69.4 MG/DL
LIPID PROFILE,FLP: NORMAL
LYMPHOCYTES # BLD: 1.4 K/UL (ref 0.5–4.6)
LYMPHOCYTES NFR BLD: 29 % (ref 13–44)
MCH RBC QN AUTO: 30.7 PG (ref 26.1–32.9)
MCHC RBC AUTO-ENTMCNC: 32.2 G/DL (ref 31.4–35)
MCV RBC AUTO: 95.1 FL (ref 79.6–97.8)
MONOCYTES # BLD: 0.5 K/UL (ref 0.1–1.3)
MONOCYTES NFR BLD: 9 % (ref 4–12)
NEUTS SEG # BLD: 3 K/UL (ref 1.7–8.2)
NEUTS SEG NFR BLD: 59 % (ref 43–78)
NRBC # BLD: 0 K/UL (ref 0–0.2)
PLATELET # BLD AUTO: 215 K/UL (ref 150–450)
PMV BLD AUTO: 10.5 FL (ref 9.4–12.3)
POTASSIUM SERPL-SCNC: 4.4 MMOL/L (ref 3.5–5.1)
PROT SERPL-MCNC: 6.4 G/DL (ref 6.3–8.2)
RBC # BLD AUTO: 4.11 M/UL (ref 4.23–5.6)
SODIUM SERPL-SCNC: 140 MMOL/L (ref 138–145)
TRIGL SERPL-MCNC: 68 MG/DL (ref 35–150)
TROPONIN-HIGH SENSITIVITY: 19.6 PG/ML (ref 0–14)
VLDLC SERPL CALC-MCNC: 13.6 MG/DL (ref 6–23)
WBC # BLD AUTO: 5 K/UL (ref 4.3–11.1)

## 2021-04-08 PROCEDURE — 80061 LIPID PANEL: CPT

## 2021-04-08 PROCEDURE — 85025 COMPLETE CBC W/AUTO DIFF WBC: CPT

## 2021-04-08 PROCEDURE — 80053 COMPREHEN METABOLIC PANEL: CPT

## 2021-04-08 PROCEDURE — 36415 COLL VENOUS BLD VENIPUNCTURE: CPT

## 2021-04-08 PROCEDURE — 84484 ASSAY OF TROPONIN QUANT: CPT

## 2021-04-08 PROCEDURE — 83880 ASSAY OF NATRIURETIC PEPTIDE: CPT

## 2021-05-12 ENCOUNTER — HOSPITAL ENCOUNTER (OUTPATIENT)
Dept: LAB | Age: 66
Discharge: HOME | End: 2021-05-12
Payer: MEDICARE

## 2021-05-12 DIAGNOSIS — I42.1 HYPERTROPHIC OBSTRUCTIVE CARDIOMYOPATHY (HCC): ICD-10-CM

## 2021-05-12 DIAGNOSIS — I25.9 ISCHEMIC HEART DISEASE: ICD-10-CM

## 2021-05-12 LAB — BNP SERPL-MCNC: 106 PG/ML (ref 5–125)

## 2021-05-12 PROCEDURE — 36415 COLL VENOUS BLD VENIPUNCTURE: CPT

## 2021-05-12 PROCEDURE — 83880 ASSAY OF NATRIURETIC PEPTIDE: CPT

## 2022-03-19 PROBLEM — N40.1 BENIGN PROSTATIC HYPERPLASIA WITH INCOMPLETE BLADDER EMPTYING: Status: ACTIVE | Noted: 2018-01-02

## 2022-03-19 PROBLEM — E78.5 DYSLIPIDEMIA: Status: ACTIVE | Noted: 2018-07-16

## 2022-03-19 PROBLEM — F98.8 ATTENTION DEFICIT DISORDER (ADD) WITHOUT HYPERACTIVITY: Status: ACTIVE | Noted: 2018-01-02

## 2022-03-19 PROBLEM — R39.14 BENIGN PROSTATIC HYPERPLASIA WITH INCOMPLETE BLADDER EMPTYING: Status: ACTIVE | Noted: 2018-01-02

## 2022-09-13 NOTE — PROGRESS NOTES
No chest pain or palpitation or dizziness. [facility] CARDIOLOGY  [address], [address]  PHONE: [phone]        22        NAME:  Modesto Kaur  : 1955  MRN: [id number]     CHIEF COMPLAINT:    Coronary Artery Disease         SUBJECTIVE:     Doing well. Has been to [address] and no intervention recommended. + pedal edema x sev weeks. Medications were all reviewed with the patient today and updated as necessary. Current Outpatient Medications   Medication Sig    dilTIAZem (CARDIZEM) 30 MG tablet 30 mg 60 mg in AM and 30 mg PM    aspirin 81 MG EC tablet Take 81 mg by mouth daily    citalopram (CELEXA) 40 MG tablet Take 40 mg by mouth daily    Evolocumab (REPATHA SURECLICK) 670 MG/ML SOAJ Inject 140 mg into the skin every 14 days    fexofenadine (ALLEGRA) 180 MG tablet Take by mouth as needed    finasteride (PROSCAR) 5 MG tablet Take 5 mg by mouth daily    fluticasone (FLONASE) 50 MCG/ACT nasal spray 2 sprays by Nasal route daily    metoprolol succinate (TOPROL XL) 100 MG extended release tablet Take 100 mg by mouth daily    pantoprazole (PROTONIX) 40 MG tablet Take 40 mg by mouth daily    ranolazine (RANEXA) 500 MG extended release tablet Take 500 mg by mouth 2 times daily    rosuvastatin (CRESTOR) 20 MG tablet Take 20 mg by mouth daily    sildenafil (VIAGRA) 100 MG tablet Take 100 mg by mouth as needed    tamsulosin (FLOMAX) 0.4 MG capsule Take 0.4 mg by mouth daily     No current facility-administered medications for this visit.         Allergies   Allergen Reactions    Codeine Nausea And Vomiting           PHYSICAL EXAM:     Wt Readings from Last 3 Encounters:   22 229 lb (103.9 kg)   22 227 lb (103 kg)   21 224 lb (101.6 kg)     BP Readings from Last 3 Encounters:   22 132/68   22 122/70   21 130/80       /68   Pulse 63   Ht 6' (1.829 m)   Wt 229 lb (103.9 kg)   BMI 31.06 kg/m²     Physical Exam  Vitals reviewed. HENT:      Head: Normocephalic and atraumatic. Eyes:      Extraocular Movements: Extraocular movements intact. Pupils: Pupils are equal, round, and reactive to light. Cardiovascular:      Rate and Rhythm: Normal rate. Heart sounds: Murmur heard. Pulmonary:      Effort: Pulmonary effort is normal.      Breath sounds: Normal breath sounds. Abdominal:      General: Abdomen is flat. Palpations: Abdomen is soft. There is no mass. Musculoskeletal:         General: Swelling present. Normal range of motion. Cervical back: Normal range of motion. Skin:     General: Skin is warm and dry. Neurological:      General: No focal deficit present. Mental Status: He is alert and oriented to person, place, and time. Psychiatric:         Mood and Affect: Mood normal.         RECENT LABS AND RECORDS REVIEW    Ekg:    Sinus  Rhythm   -Left atrial enlargement.    -Poor R-wave progression -nonspecific    Echo at [address]: gradient 35 mmHg at rest and 119 mHg w/ valsalva. ASSESSMENT and PLAN    Karol Ceron was seen today for coronary artery disease. Diagnoses and all orders for this visit:    ASCVD (arteriosclerotic cardiovascular disease)  -     EKG 12 lead    Hypertrophic obstructive cardiomyopathy (HCC)  -     EKG 12 lead     ////    CV status is stable. I mentioned [place]. Will observe edema. Medications and most recent labs reviewed. Diet and exercise are encouraged. Greater  than 50% of today's visit was devoted to counseling the patient, explaining disease concepts and offering advice and suggestions for optimal care. Return in about 6 months (around 3/14/2023).        Vidya Zaragoza MD  9/14/2022  8:19 AM

## 2022-09-14 ENCOUNTER — OFFICE VISIT (OUTPATIENT)
Dept: CARDIOLOGY CLINIC | Age: 67
End: 2022-09-14
Payer: MEDICARE

## 2022-09-14 VITALS
HEART RATE: 63 BPM | SYSTOLIC BLOOD PRESSURE: 132 MMHG | HEIGHT: 72 IN | WEIGHT: 229 LBS | DIASTOLIC BLOOD PRESSURE: 68 MMHG | BODY MASS INDEX: 31.02 KG/M2

## 2022-09-14 DIAGNOSIS — I42.1 HYPERTROPHIC OBSTRUCTIVE CARDIOMYOPATHY (HCC): ICD-10-CM

## 2022-09-14 DIAGNOSIS — I25.10 ASCVD (ARTERIOSCLEROTIC CARDIOVASCULAR DISEASE): Primary | ICD-10-CM

## 2022-09-14 PROCEDURE — 1036F TOBACCO NON-USER: CPT | Performed by: INTERNAL MEDICINE

## 2022-09-14 PROCEDURE — 93000 ELECTROCARDIOGRAM COMPLETE: CPT | Performed by: INTERNAL MEDICINE

## 2022-09-14 PROCEDURE — G8419 CALC BMI OUT NRM PARAM NOF/U: HCPCS | Performed by: INTERNAL MEDICINE

## 2022-09-14 PROCEDURE — 99214 OFFICE O/P EST MOD 30 MIN: CPT | Performed by: INTERNAL MEDICINE

## 2022-09-14 PROCEDURE — G8428 CUR MEDS NOT DOCUMENT: HCPCS | Performed by: INTERNAL MEDICINE

## 2022-09-14 PROCEDURE — 1123F ACP DISCUSS/DSCN MKR DOCD: CPT | Performed by: INTERNAL MEDICINE

## 2022-09-14 PROCEDURE — 3017F COLORECTAL CA SCREEN DOC REV: CPT | Performed by: INTERNAL MEDICINE

## 2022-10-07 DIAGNOSIS — I10 ESSENTIAL HYPERTENSION WITH GOAL BLOOD PRESSURE LESS THAN 140/90: Primary | ICD-10-CM

## 2022-10-07 DIAGNOSIS — I10 ESSENTIAL HYPERTENSION WITH GOAL BLOOD PRESSURE LESS THAN 140/90: ICD-10-CM

## 2022-10-07 RX ORDER — METOPROLOL SUCCINATE 100 MG/1
100 TABLET, EXTENDED RELEASE ORAL DAILY
Qty: 30 TABLET | Refills: 0 | Status: CANCELLED | OUTPATIENT
Start: 2022-10-07 | End: 2023-01-05

## 2022-10-07 RX ORDER — METOPROLOL SUCCINATE 100 MG/1
100 TABLET, EXTENDED RELEASE ORAL DAILY
Qty: 30 TABLET | Refills: 0 | Status: SHIPPED | OUTPATIENT
Start: 2022-10-07 | End: 2022-10-13 | Stop reason: SDUPTHER

## 2022-10-07 NOTE — TELEPHONE ENCOUNTER
Patient request refill of Metoprolol Succinate  MG po daily sent to [address]. Patient already has an  appointment scheduled for 10/13/22.

## 2022-10-13 DIAGNOSIS — I10 ESSENTIAL HYPERTENSION WITH GOAL BLOOD PRESSURE LESS THAN 140/90: ICD-10-CM

## 2022-10-13 RX ORDER — METOPROLOL SUCCINATE 100 MG/1
100 TABLET, EXTENDED RELEASE ORAL DAILY
Qty: 90 TABLET | Refills: 0 | Status: SHIPPED | OUTPATIENT
Start: 2022-10-13 | End: 2023-01-11

## 2022-10-13 RX ORDER — FINASTERIDE 5 MG/1
5 TABLET, FILM COATED ORAL DAILY
Qty: 90 TABLET | Refills: 0 | Status: SHIPPED | OUTPATIENT
Start: 2022-10-13

## 2022-10-13 RX ORDER — TAMSULOSIN HYDROCHLORIDE 0.4 MG/1
0.4 CAPSULE ORAL DAILY
Qty: 90 CAPSULE | Refills: 0 | Status: SHIPPED | OUTPATIENT
Start: 2022-10-13

## 2022-10-13 RX ORDER — SILDENAFIL 100 MG/1
100 TABLET, FILM COATED ORAL PRN
Qty: 90 TABLET | Refills: 0 | Status: SHIPPED | OUTPATIENT
Start: 2022-10-13

## 2022-10-13 RX ORDER — ROSUVASTATIN CALCIUM 20 MG/1
20 TABLET, COATED ORAL DAILY
Qty: 90 TABLET | Refills: 0 | Status: SHIPPED | OUTPATIENT
Start: 2022-10-13

## 2022-10-13 RX ORDER — PANTOPRAZOLE SODIUM 40 MG/1
40 TABLET, DELAYED RELEASE ORAL DAILY
Qty: 90 TABLET | Refills: 0 | Status: SHIPPED | OUTPATIENT
Start: 2022-10-13

## 2022-10-13 RX ORDER — CITALOPRAM 40 MG/1
40 TABLET ORAL DAILY
Qty: 90 TABLET | Refills: 0 | Status: SHIPPED | OUTPATIENT
Start: 2022-10-13

## 2022-11-11 ENCOUNTER — OFFICE VISIT (OUTPATIENT)
Dept: FAMILY MEDICINE CLINIC | Facility: CLINIC | Age: 67
End: 2022-11-11
Payer: MEDICARE

## 2022-11-11 VITALS
BODY MASS INDEX: 31.02 KG/M2 | SYSTOLIC BLOOD PRESSURE: 116 MMHG | WEIGHT: 229 LBS | HEIGHT: 72 IN | DIASTOLIC BLOOD PRESSURE: 66 MMHG

## 2022-11-11 DIAGNOSIS — F98.8 ATTENTION DEFICIT DISORDER (ADD) WITHOUT HYPERACTIVITY: Primary | ICD-10-CM

## 2022-11-11 PROCEDURE — G8417 CALC BMI ABV UP PARAM F/U: HCPCS | Performed by: FAMILY MEDICINE

## 2022-11-11 PROCEDURE — 3078F DIAST BP <80 MM HG: CPT | Performed by: FAMILY MEDICINE

## 2022-11-11 PROCEDURE — G8427 DOCREV CUR MEDS BY ELIG CLIN: HCPCS | Performed by: FAMILY MEDICINE

## 2022-11-11 PROCEDURE — G8484 FLU IMMUNIZE NO ADMIN: HCPCS | Performed by: FAMILY MEDICINE

## 2022-11-11 PROCEDURE — 1036F TOBACCO NON-USER: CPT | Performed by: FAMILY MEDICINE

## 2022-11-11 PROCEDURE — 3017F COLORECTAL CA SCREEN DOC REV: CPT | Performed by: FAMILY MEDICINE

## 2022-11-11 PROCEDURE — 1123F ACP DISCUSS/DSCN MKR DOCD: CPT | Performed by: FAMILY MEDICINE

## 2022-11-11 PROCEDURE — 3074F SYST BP LT 130 MM HG: CPT | Performed by: FAMILY MEDICINE

## 2022-11-11 PROCEDURE — 99213 OFFICE O/P EST LOW 20 MIN: CPT | Performed by: FAMILY MEDICINE

## 2022-11-11 RX ORDER — DEXTROAMPHETAMINE SACCHARATE, AMPHETAMINE ASPARTATE, DEXTROAMPHETAMINE SULFATE AND AMPHETAMINE SULFATE 2.5; 2.5; 2.5; 2.5 MG/1; MG/1; MG/1; MG/1
10 TABLET ORAL 2 TIMES DAILY
Qty: 60 TABLET | Refills: 0 | Status: SHIPPED | OUTPATIENT
Start: 2022-11-11 | End: 2022-12-11

## 2022-11-11 SDOH — ECONOMIC STABILITY: FOOD INSECURITY: WITHIN THE PAST 12 MONTHS, YOU WORRIED THAT YOUR FOOD WOULD RUN OUT BEFORE YOU GOT MONEY TO BUY MORE.: NEVER TRUE

## 2022-11-11 SDOH — ECONOMIC STABILITY: FOOD INSECURITY: WITHIN THE PAST 12 MONTHS, THE FOOD YOU BOUGHT JUST DIDN'T LAST AND YOU DIDN'T HAVE MONEY TO GET MORE.: NEVER TRUE

## 2022-11-11 ASSESSMENT — PATIENT HEALTH QUESTIONNAIRE - PHQ9
5. POOR APPETITE OR OVEREATING: 0
7. TROUBLE CONCENTRATING ON THINGS, SUCH AS READING THE NEWSPAPER OR WATCHING TELEVISION: 1
2. FEELING DOWN, DEPRESSED OR HOPELESS: 0
1. LITTLE INTEREST OR PLEASURE IN DOING THINGS: 0
9. THOUGHTS THAT YOU WOULD BE BETTER OFF DEAD, OR OF HURTING YOURSELF: 0
SUM OF ALL RESPONSES TO PHQ QUESTIONS 1-9: 1
SUM OF ALL RESPONSES TO PHQ QUESTIONS 1-9: 1
3. TROUBLE FALLING OR STAYING ASLEEP: 0
8. MOVING OR SPEAKING SO SLOWLY THAT OTHER PEOPLE COULD HAVE NOTICED. OR THE OPPOSITE, BEING SO FIGETY OR RESTLESS THAT YOU HAVE BEEN MOVING AROUND A LOT MORE THAN USUAL: 0
SUM OF ALL RESPONSES TO PHQ9 QUESTIONS 1 & 2: 0
SUM OF ALL RESPONSES TO PHQ QUESTIONS 1-9: 1
SUM OF ALL RESPONSES TO PHQ QUESTIONS 1-9: 1
10. IF YOU CHECKED OFF ANY PROBLEMS, HOW DIFFICULT HAVE THESE PROBLEMS MADE IT FOR YOU TO DO YOUR WORK, TAKE CARE OF THINGS AT HOME, OR GET ALONG WITH OTHER PEOPLE: 0
4. FEELING TIRED OR HAVING LITTLE ENERGY: 0
6. FEELING BAD ABOUT YOURSELF - OR THAT YOU ARE A FAILURE OR HAVE LET YOURSELF OR YOUR FAMILY DOWN: 0

## 2022-11-11 ASSESSMENT — ENCOUNTER SYMPTOMS
SHORTNESS OF BREATH: 0
COUGH: 0
DIARRHEA: 0
SINUS PAIN: 0
RHINORRHEA: 0
ABDOMINAL PAIN: 0

## 2022-11-11 ASSESSMENT — SOCIAL DETERMINANTS OF HEALTH (SDOH): HOW HARD IS IT FOR YOU TO PAY FOR THE VERY BASICS LIKE FOOD, HOUSING, MEDICAL CARE, AND HEATING?: NOT HARD AT ALL

## 2022-11-11 NOTE — PROGRESS NOTES
PROGRESS NOTE    SUBJECTIVE:   Kim Cooper is a 77 y.o. male seen for a follow up visit regarding the following chief complaint:     Chief Complaint   Patient presents with    ADD     Dx with ADD long time ago, pt thinks he needs medication again            HPI patient presents office with a history of ADD we did a PQRS ADD evaluation and he showed to have a lot of the traits of inattentiveness as well as hyperactivity states that with his job that he has been working for 8 years he cannot finish 1 task without moving onto the other bounces around all the time      Past Medical History, Past Surgical History, Family history, Social History, and Medications were all reviewed with the patient today and updated as necessary. Current Outpatient Medications   Medication Sig Dispense Refill    amphetamine-dextroamphetamine (ADDERALL, 10MG,) 10 MG tablet Take 1 tablet by mouth 2 times daily for 30 days.  60 tablet 0    citalopram (CELEXA) 40 MG tablet Take 1 tablet by mouth daily 90 tablet 0    sildenafil (VIAGRA) 100 MG tablet Take 1 tablet by mouth as needed for Erectile Dysfunction 90 tablet 0    pantoprazole (PROTONIX) 40 MG tablet Take 1 tablet by mouth daily 90 tablet 0    rosuvastatin (CRESTOR) 20 MG tablet Take 1 tablet by mouth daily 90 tablet 0    tamsulosin (FLOMAX) 0.4 MG capsule Take 1 capsule by mouth daily 90 capsule 0    metoprolol succinate (TOPROL XL) 100 MG extended release tablet Take 1 tablet by mouth daily 90 tablet 0    finasteride (PROSCAR) 5 MG tablet Take 1 tablet by mouth daily 90 tablet 0    dilTIAZem (CARDIZEM) 30 MG tablet 30 mg 60 mg in AM and 30 mg PM      aspirin 81 MG EC tablet Take 81 mg by mouth daily      Evolocumab (REPATHA SURECLICK) 481 MG/ML SOAJ Inject 140 mg into the skin every 14 days      fexofenadine (ALLEGRA) 180 MG tablet Take by mouth as needed      fluticasone (FLONASE) 50 MCG/ACT nasal spray 2 sprays by Nasal route daily      ranolazine (RANEXA) 500 MG extended release tablet Take 500 mg by mouth 2 times daily       No current facility-administered medications for this visit.      Allergies   Allergen Reactions    Codeine Nausea And Vomiting     Patient Active Problem List   Diagnosis    Erectile dysfunction    Paroxysmal atrial fibrillation (HCC)    Attention deficit disorder (ADD) without hyperactivity    Essential hypertension with goal blood pressure less than 140/90    Sleep apnea    Angina pectoris ([address]    Dyslipidemia    Attention deficit hyperactivity disorder (ADHD), predominantly inattentive type    Benign prostatic hyperplasia with incomplete bladder emptying    Pure hypercholesterolemia    Hypertrophic obstructive cardiomyopathy ([address]    Depression    Coronary artery disease of native artery with stable angina pectoris ([address]     Past Medical History:   Diagnosis Date    ADD (attention deficit disorder)     Angina pectoris ([address] 12/28/2015    Arrhythmia     Atherosclerosis of arteries of extremities ([facility] 12/28/2015    CAD (coronary artery disease)     Dr. Piedad Ross Coagulation disorder [facility])     GI Bleed    Coronary atherosclerosis of native coronary artery 7/30/2012    Depression     Essential hypertension 12/23/2015    GERD (gastroesophageal reflux disease)     HTN (hypertension) 7/30/2012    Hypercholesterolemia     Hyperlipidemia 12/28/2015    Hypertension     Hypertrophic obstructive cardiomyopathy ([facility] 12/28/2015    Obesity 12/28/2015    Orthostatic hypotension 7/30/2012    Other unknown and unspecified cause of morbidity or mortality     HLD    Palpitations 12/28/2015    Peptic ulcer disease 12/28/2015    Postoperative hematoma of skin 12/28/2015    PUD (peptic ulcer disease)     Sleep apnea 12/28/2015    cpap    Upper GI bleed 7/30/2012     Past Surgical History:   Procedure Laterality Date    ASD REPAIR  2015    Dr. Kendal Godinez  2015    stent insertion    CATARACT REMOVAL Right 2015    COLONOSCOPY  09/18/2012    Dr. Mary Yepez in 5-7 years.  HEENT      tonsillectomy,sinus surgery    ORTHOPEDIC SURGERY      R knee surgery    [place] CARDIAC SURG PROCEDURE UNLIST      coronary stents x 2 2005 pci X 2 2011     Family History   Problem Relation Age of Onset    Heart Disease Father     Lung Disease Mother     Heart Disease Mother      Social History     Tobacco Use    Smoking status: Never     Passive exposure: Never    Smokeless tobacco: Never   Substance Use Topics    Alcohol use: Yes         Review of Systems   Constitutional:  Negative for chills, fatigue and fever. Inability to stay focused   HENT:  Negative for congestion, rhinorrhea and sinus pain. Eyes:  Negative for visual disturbance. Respiratory:  Negative for cough and shortness of breath. Cardiovascular:  Negative for chest pain. Gastrointestinal:  Negative for abdominal pain and diarrhea. Genitourinary:  Negative for dysuria. Musculoskeletal:  Negative for arthralgias and myalgias. Neurological:  Negative for dizziness, weakness and headaches. Psychiatric/Behavioral: Negative. OBJECTIVE:  /66 (Site: Left Upper Arm, Position: Sitting, Cuff Size: Large Adult)   Ht 6' (1.829 m)   Wt 229 lb (103.9 kg)   BMI 31.06 kg/m²      Physical Exam  Vitals and nursing note reviewed. Constitutional:       Appearance: Normal appearance. Eyes:      Pupils: Pupils are equal, round, and reactive to light. Cardiovascular:      Rate and Rhythm: Normal rate and regular rhythm. Pulmonary:      Effort: Pulmonary effort is normal.      Breath sounds: Normal breath sounds. Neurological:      Mental Status: He is alert. Psychiatric:         Mood and Affect: Mood normal.         Behavior: Behavior normal.        Medical problems and test results were reviewed with the patient today.      No results found for this or any previous visit (from the past 672 hour(s)). ASSESSMENT and PLAN    Visit Diagnoses and Associated Orders       Attention deficit disorder (ADD) without hyperactivity    -  Primary    amphetamine-dextroamphetamine (ADDERALL, 10MG,) 10 MG tablet [9081]                       Diagnosis Orders   1. Attention deficit disorder (ADD) without hyperactivity  amphetamine-dextroamphetamine (ADDERALL, 10MG,) 10 MG tablet      , Sri Schofield was seen today for add. Diagnoses and all orders for this visit:    Attention deficit disorder (ADD) without hyperactivity  -     amphetamine-dextroamphetamine (ADDERALL, 10MG,) 10 MG tablet; Take 1 tablet by mouth 2 times daily for 30 days.     , After long lengthy discussion risk benefits and options and with his chronic medical conditions we will start him on a low-dose and see how he responds we will see him back in a couple weeks for his blood pressure heart rate etc. and weight supportive care given precautions given we will discuss with his cardiologist if he needs to go up or if we decide to start on a nonstimulant as well

## 2022-12-02 ENCOUNTER — OFFICE VISIT (OUTPATIENT)
Dept: FAMILY MEDICINE CLINIC | Facility: CLINIC | Age: 67
End: 2022-12-02
Payer: MEDICARE

## 2022-12-02 VITALS
HEART RATE: 52 BPM | HEIGHT: 72 IN | SYSTOLIC BLOOD PRESSURE: 120 MMHG | BODY MASS INDEX: 30.75 KG/M2 | DIASTOLIC BLOOD PRESSURE: 66 MMHG | WEIGHT: 227 LBS | OXYGEN SATURATION: 97 %

## 2022-12-02 DIAGNOSIS — F98.8 ATTENTION DEFICIT DISORDER (ADD) WITHOUT HYPERACTIVITY: ICD-10-CM

## 2022-12-02 PROCEDURE — 1123F ACP DISCUSS/DSCN MKR DOCD: CPT | Performed by: FAMILY MEDICINE

## 2022-12-02 PROCEDURE — G8484 FLU IMMUNIZE NO ADMIN: HCPCS | Performed by: FAMILY MEDICINE

## 2022-12-02 PROCEDURE — 3074F SYST BP LT 130 MM HG: CPT | Performed by: FAMILY MEDICINE

## 2022-12-02 PROCEDURE — 1036F TOBACCO NON-USER: CPT | Performed by: FAMILY MEDICINE

## 2022-12-02 PROCEDURE — 3017F COLORECTAL CA SCREEN DOC REV: CPT | Performed by: FAMILY MEDICINE

## 2022-12-02 PROCEDURE — 3078F DIAST BP <80 MM HG: CPT | Performed by: FAMILY MEDICINE

## 2022-12-02 PROCEDURE — G8417 CALC BMI ABV UP PARAM F/U: HCPCS | Performed by: FAMILY MEDICINE

## 2022-12-02 PROCEDURE — G8427 DOCREV CUR MEDS BY ELIG CLIN: HCPCS | Performed by: FAMILY MEDICINE

## 2022-12-02 PROCEDURE — 99213 OFFICE O/P EST LOW 20 MIN: CPT | Performed by: FAMILY MEDICINE

## 2022-12-02 RX ORDER — DEXTROAMPHETAMINE SACCHARATE, AMPHETAMINE ASPARTATE, DEXTROAMPHETAMINE SULFATE AND AMPHETAMINE SULFATE 5; 5; 5; 5 MG/1; MG/1; MG/1; MG/1
20 TABLET ORAL 2 TIMES DAILY
Qty: 60 TABLET | Refills: 0 | Status: SHIPPED | OUTPATIENT
Start: 2022-12-02 | End: 2023-01-01

## 2022-12-02 ASSESSMENT — PATIENT HEALTH QUESTIONNAIRE - PHQ9
3. TROUBLE FALLING OR STAYING ASLEEP: 0
SUM OF ALL RESPONSES TO PHQ9 QUESTIONS 1 & 2: 0
SUM OF ALL RESPONSES TO PHQ QUESTIONS 1-9: 0
7. TROUBLE CONCENTRATING ON THINGS, SUCH AS READING THE NEWSPAPER OR WATCHING TELEVISION: 0
10. IF YOU CHECKED OFF ANY PROBLEMS, HOW DIFFICULT HAVE THESE PROBLEMS MADE IT FOR YOU TO DO YOUR WORK, TAKE CARE OF THINGS AT HOME, OR GET ALONG WITH OTHER PEOPLE: 0
9. THOUGHTS THAT YOU WOULD BE BETTER OFF DEAD, OR OF HURTING YOURSELF: 0
4. FEELING TIRED OR HAVING LITTLE ENERGY: 0
8. MOVING OR SPEAKING SO SLOWLY THAT OTHER PEOPLE COULD HAVE NOTICED. OR THE OPPOSITE, BEING SO FIGETY OR RESTLESS THAT YOU HAVE BEEN MOVING AROUND A LOT MORE THAN USUAL: 0
5. POOR APPETITE OR OVEREATING: 0
1. LITTLE INTEREST OR PLEASURE IN DOING THINGS: 0
SUM OF ALL RESPONSES TO PHQ QUESTIONS 1-9: 0
2. FEELING DOWN, DEPRESSED OR HOPELESS: 0
6. FEELING BAD ABOUT YOURSELF - OR THAT YOU ARE A FAILURE OR HAVE LET YOURSELF OR YOUR FAMILY DOWN: 0

## 2022-12-02 ASSESSMENT — ENCOUNTER SYMPTOMS
NAUSEA: 0
VOMITING: 0
SHORTNESS OF BREATH: 0

## 2022-12-02 NOTE — PROGRESS NOTES
PROGRESS NOTE    SUBJECTIVE:   Carmelita Gutiérrez is a 77 y.o. male seen for a follow up visit regarding the following chief complaint:     Chief Complaint   Patient presents with    Other     Recent adderall new prescription, comes for a f/u           HPI presents to the office today for follow-up of his ADD we started him on a low-dose of Adderall 10 mg twice daily and patient states he does not even notice that he is on it      Past Medical History, Past Surgical History, Family history, Social History, and Medications were all reviewed with the patient today and updated as necessary. Current Outpatient Medications   Medication Sig Dispense Refill    amphetamine-dextroamphetamine (ADDERALL, 20MG,) 20 MG tablet Take 1 tablet by mouth 2 times daily for 30 days. 60 tablet 0    citalopram (CELEXA) 40 MG tablet Take 1 tablet by mouth daily 90 tablet 0    sildenafil (VIAGRA) 100 MG tablet Take 1 tablet by mouth as needed for Erectile Dysfunction 90 tablet 0    pantoprazole (PROTONIX) 40 MG tablet Take 1 tablet by mouth daily 90 tablet 0    rosuvastatin (CRESTOR) 20 MG tablet Take 1 tablet by mouth daily 90 tablet 0    tamsulosin (FLOMAX) 0.4 MG capsule Take 1 capsule by mouth daily 90 capsule 0    metoprolol succinate (TOPROL XL) 100 MG extended release tablet Take 1 tablet by mouth daily 90 tablet 0    finasteride (PROSCAR) 5 MG tablet Take 1 tablet by mouth daily 90 tablet 0    dilTIAZem (CARDIZEM) 30 MG tablet 30 mg 60 mg in AM and 30 mg PM      aspirin 81 MG EC tablet Take 81 mg by mouth daily      Evolocumab (REPATHA SURECLICK) 984 MG/ML SOAJ Inject 140 mg into the skin every 14 days      fexofenadine (ALLEGRA) 180 MG tablet Take by mouth as needed      fluticasone (FLONASE) 50 MCG/ACT nasal spray 2 sprays by Nasal route daily      ranolazine (RANEXA) 500 MG extended release tablet Take 500 mg by mouth 2 times daily       No current facility-administered medications for this visit.      Allergies Allergen Reactions    Codeine Nausea And Vomiting     Patient Active Problem List   Diagnosis    Erectile dysfunction    Paroxysmal atrial fibrillation (HCC)    Attention deficit disorder (ADD) without hyperactivity    Essential hypertension with goal blood pressure less than 140/90    Sleep apnea    Angina pectoris ([facility]    Dyslipidemia    Attention deficit hyperactivity disorder (ADHD), predominantly inattentive type    Benign prostatic hyperplasia with incomplete bladder emptying    Pure hypercholesterolemia    Hypertrophic obstructive cardiomyopathy ([facility]    Depression    Coronary artery disease of native artery with stable angina pectoris [facility])     Past Medical History:   Diagnosis Date    ADD (attention deficit disorder)     Angina pectoris ([facility] 12/28/2015    Arrhythmia     Atherosclerosis of arteries of extremities ([facility] 12/28/2015    CAD (coronary artery disease)     Dr. Malave Stacks    Coagulation disorder [facility])     GI Bleed    Coronary atherosclerosis of native coronary artery 7/30/2012    Depression     Essential hypertension 12/23/2015    GERD (gastroesophageal reflux disease)     HTN (hypertension) 7/30/2012    Hypercholesterolemia     Hyperlipidemia 12/28/2015    Hypertension     Hypertrophic obstructive cardiomyopathy ([facility] 12/28/2015    Obesity 12/28/2015    Orthostatic hypotension 7/30/2012    Other unknown and unspecified cause of morbidity or mortality     HLD    Palpitations 12/28/2015    Peptic ulcer disease 12/28/2015    Postoperative hematoma of skin 12/28/2015    PUD (peptic ulcer disease)     Sleep apnea 12/28/2015    cpap    Upper GI bleed 7/30/2012     Past Surgical History:   Procedure Laterality Date    ASD REPAIR  2015    Dr. Earline Nicolas  2015    stent insertion    CATARACT REMOVAL Right 2015    COLONOSCOPY  09/18/2012    Dr. Janice Good in 5-7 years.     HEENT      tonsillectomy,sinus surgery    ORTHOPEDIC SURGERY      R knee surgery    [place] CARDIAC SURG PROCEDURE UNLIST      coronary stents x 2 2005 pci X 2 2011     Family History   Problem Relation Age of Onset    Heart Disease Father     Lung Disease Mother     Heart Disease Mother      Social History     Tobacco Use    Smoking status: Never     Passive exposure: Never    Smokeless tobacco: Never   Substance Use Topics    Alcohol use: Yes         Review of Systems   Constitutional:  Negative for fatigue and fever. Respiratory:  Negative for shortness of breath. Cardiovascular:  Negative for chest pain. Gastrointestinal:  Negative for nausea and vomiting. OBJECTIVE:  /66 (Site: Left Upper Arm, Position: Sitting, Cuff Size: Large Adult)   Pulse 52   Ht 6' (1.829 m)   Wt 227 lb (103 kg)   SpO2 97%   BMI 30.79 kg/m²      Physical Exam  Vitals and nursing note reviewed. Constitutional:       Appearance: Normal appearance. Eyes:      Pupils: Pupils are equal, round, and reactive to light. Cardiovascular:      Rate and Rhythm: Normal rate and regular rhythm. Pulmonary:      Effort: Pulmonary effort is normal.      Breath sounds: Normal breath sounds. Neurological:      Mental Status: He is alert. Psychiatric:         Mood and Affect: Mood normal.         Behavior: Behavior normal.        Medical problems and test results were reviewed with the patient today. No results found for this or any previous visit (from the past 672 hour(s)). ASSESSMENT and PLAN    Visit Diagnoses and Associated Orders       Attention deficit disorder (ADD) without hyperactivity        amphetamine-dextroamphetamine (ADDERALL, 20MG,) 20 MG tablet [9082]                       Diagnosis Orders   1. Attention deficit disorder (ADD) without hyperactivity  amphetamine-dextroamphetamine (ADDERALL, 20MG,) 20 MG tablet      , [facility] was [facility] today for other.     Diagnoses and all orders for this visit:    Attention deficit disorder (ADD) without hyperactivity  -     amphetamine-dextroamphetamine (ADDERALL, 20MG,) 20 MG tablet; Take 1 tablet by mouth 2 times daily for 30 days.     , We will increase his Adderall to 20 mg twice daily we will call him up in a couple weeks after being on it he has a blood pressure cuff so we will monitor his blood pressure supportive care given precautions given risk benefits and options discussed again

## 2022-12-09 ENCOUNTER — TELEMEDICINE (OUTPATIENT)
Dept: FAMILY MEDICINE CLINIC | Facility: CLINIC | Age: 67
End: 2022-12-09
Payer: MEDICARE

## 2022-12-09 DIAGNOSIS — F98.8 ATTENTION DEFICIT DISORDER (ADD) WITHOUT HYPERACTIVITY: Primary | ICD-10-CM

## 2022-12-09 PROBLEM — F32.5 MAJOR DEPRESSIVE DISORDER, SINGLE EPISODE, IN FULL REMISSION (HCC): Status: ACTIVE | Noted: 2022-12-09

## 2022-12-09 PROCEDURE — 99442 PR PHYS/QHP TELEPHONE EVALUATION 11-20 MIN: CPT | Performed by: FAMILY MEDICINE

## 2022-12-09 ASSESSMENT — ENCOUNTER SYMPTOMS
VOMITING: 0
NAUSEA: 0
SHORTNESS OF BREATH: 0

## 2022-12-09 NOTE — PROGRESS NOTES
PROGRESS NOTE  This visit was conducted via the phone with patient's consent to the visit and all associated charges to reduce the patient's risk of exposure to COVID-19 and continuity of care for an established patient. He and/or his healthcare decision maker is aware that this patient-initiated phone encounter is a billable service, with coverage as determined by his insurance carrier. He is aware that he may receive a bill and has provided verbal consent to proceed: Yes    Vitals and physical exam deferred due to telephone visit. Total Time: minutes: 11-20 minutes. SUBJECTIVE:   Mindy Herrmann is a 77 y.o. male seen for a follow up visit regarding the following chief complaint:     No chief complaint on file. HPI patient is doing a phone call visit to go over how he is feeling after we recently adjusted his medications for ADD states he is doing great      Past Medical History, Past Surgical History, Family history, Social History, and Medications were all reviewed with the patient today and updated as necessary. Current Outpatient Medications   Medication Sig Dispense Refill    amphetamine-dextroamphetamine (ADDERALL, 20MG,) 20 MG tablet Take 1 tablet by mouth 2 times daily for 30 days.  60 tablet 0    citalopram (CELEXA) 40 MG tablet Take 1 tablet by mouth daily 90 tablet 0    sildenafil (VIAGRA) 100 MG tablet Take 1 tablet by mouth as needed for Erectile Dysfunction 90 tablet 0    pantoprazole (PROTONIX) 40 MG tablet Take 1 tablet by mouth daily 90 tablet 0    rosuvastatin (CRESTOR) 20 MG tablet Take 1 tablet by mouth daily 90 tablet 0    tamsulosin (FLOMAX) 0.4 MG capsule Take 1 capsule by mouth daily 90 capsule 0    metoprolol succinate (TOPROL XL) 100 MG extended release tablet Take 1 tablet by mouth daily 90 tablet 0    finasteride (PROSCAR) 5 MG tablet Take 1 tablet by mouth daily 90 tablet 0    dilTIAZem (CARDIZEM) 30 MG tablet 30 mg 60 mg in AM and 30 mg PM      aspirin 81 MG EC tablet Take 81 mg by mouth daily      Evolocumab (REPATHA SURECLICK) 432 MG/ML SOAJ Inject 140 mg into the skin every 14 days      fexofenadine (ALLEGRA) 180 MG tablet Take by mouth as needed      fluticasone (FLONASE) 50 MCG/ACT nasal spray 2 sprays by Nasal route daily      ranolazine (RANEXA) 500 MG extended release tablet Take 500 mg by mouth 2 times daily       No current facility-administered medications for this visit.      Allergies   Allergen Reactions    Codeine Nausea And Vomiting     Patient Active Problem List   Diagnosis    Erectile dysfunction    Paroxysmal atrial fibrillation (HCC)    Attention deficit disorder (ADD) without hyperactivity    Essential hypertension with goal blood pressure less than 140/90    Sleep apnea    Angina pectoris ([facility]    Dyslipidemia    Attention deficit hyperactivity disorder (ADHD), predominantly inattentive type    Benign prostatic hyperplasia with incomplete bladder emptying    Pure hypercholesterolemia    Hypertrophic obstructive cardiomyopathy ([facility]    Depression    Coronary artery disease of native artery with stable angina pectoris ([facility]    Major depressive disorder, single episode, in full remission [facility])     Past Medical History:   Diagnosis Date    ADD (attention deficit disorder)     Angina pectoris ([facility] 12/28/2015    Arrhythmia     Atherosclerosis of arteries of extremities ([facility] 12/28/2015    CAD (coronary artery disease)     Dr. Hai Price    Coagulation disorder [facility])     GI Bleed    Coronary atherosclerosis of native coronary artery 7/30/2012    Depression     Essential hypertension 12/23/2015    GERD (gastroesophageal reflux disease)     HTN (hypertension) 7/30/2012    Hypercholesterolemia     Hyperlipidemia 12/28/2015    Hypertension     Hypertrophic obstructive cardiomyopathy ([facility] 12/28/2015    Obesity 12/28/2015    Orthostatic hypotension 7/30/2012    Other unknown and unspecified cause of morbidity or mortality     HLD    Palpitations 12/28/2015 Peptic ulcer disease 12/28/2015    Postoperative hematoma of skin 12/28/2015    PUD (peptic ulcer disease)     Sleep apnea 12/28/2015    cpap    Upper GI bleed 7/30/2012     Past Surgical History:   Procedure Laterality Date    ASD REPAIR  2015    Dr. Patricia Connor  2015    stent insertion    CATARACT REMOVAL Right 2015    COLONOSCOPY  09/18/2012    Dr. Tej Fox in 5-7 years. HEENT      tonsillectomy,sinus surgery    ORTHOPEDIC SURGERY      R knee surgery    [place] CARDIAC SURG PROCEDURE UNLIST      coronary stents x 2 2005 pci X 2 2011     Family History   Problem Relation Age of Onset    Heart Disease Father     Lung Disease Mother     Heart Disease Mother      Social History     Tobacco Use    Smoking status: Never     Passive exposure: Never    Smokeless tobacco: Never   Substance Use Topics    Alcohol use: Yes         Review of Systems   Constitutional:  Negative for fatigue and fever. Respiratory:  Negative for shortness of breath. Cardiovascular:  Negative for chest pain. Gastrointestinal:  Negative for nausea and vomiting. OBJECTIVE:  There were no vitals taken for this visit. Physical Exam     Medical problems and test results were reviewed with the patient today. No results found for this or any previous visit (from the past 672 hour(s)). ASSESSMENT and PLAN    Visit Diagnoses and Associated Orders       Attention deficit disorder (ADD) without hyperactivity    -  Primary                     Diagnosis Orders   1.  Attention deficit disorder (ADD) without hyperactivity        , Diagnoses and all orders for this visit:    Attention deficit disorder (ADD) without hyperactivity    , Patient is happy with the new adjustment continue on present medication denies any weight loss palpitations or elevation in blood pressure we will see him in early for his physical otherwise patient knows how to call back through [address] or phone call for refills supportive care given precautions given

## 2022-12-30 DIAGNOSIS — I10 ESSENTIAL HYPERTENSION WITH GOAL BLOOD PRESSURE LESS THAN 140/90: ICD-10-CM

## 2023-01-04 ENCOUNTER — CLINICAL DOCUMENTATION (OUTPATIENT)
Dept: CARDIOLOGY CLINIC | Age: 68
End: 2023-01-04

## 2023-01-04 NOTE — PROGRESS NOTES
[employer] (Gonzalez: [id number])  [employer] [place] 712PD/AE auto-injectors       Authorization already on file for this request.     Authorization starting on 01/01/2023 and ending on 12/31/2023

## 2023-01-05 RX ORDER — METOPROLOL SUCCINATE 100 MG/1
100 TABLET, EXTENDED RELEASE ORAL DAILY
Qty: 30 TABLET | Refills: 0 | Status: SHIPPED | OUTPATIENT
Start: 2023-01-05

## 2023-01-05 RX ORDER — CITALOPRAM 40 MG/1
40 TABLET ORAL DAILY
Qty: 30 TABLET | Refills: 0 | Status: SHIPPED | OUTPATIENT
Start: 2023-01-05

## 2023-03-21 ENCOUNTER — OFFICE VISIT (OUTPATIENT)
Dept: CARDIOLOGY CLINIC | Age: 68
End: 2023-03-21
Payer: MEDICARE

## 2023-03-21 VITALS
HEART RATE: 56 BPM | WEIGHT: 215 LBS | BODY MASS INDEX: 29.12 KG/M2 | HEIGHT: 72 IN | DIASTOLIC BLOOD PRESSURE: 80 MMHG | SYSTOLIC BLOOD PRESSURE: 130 MMHG

## 2023-03-21 DIAGNOSIS — I42.1 HYPERTROPHIC OBSTRUCTIVE CARDIOMYOPATHY (HCC): ICD-10-CM

## 2023-03-21 DIAGNOSIS — I10 PRIMARY HYPERTENSION: ICD-10-CM

## 2023-03-21 DIAGNOSIS — I25.10 ASCVD (ARTERIOSCLEROTIC CARDIOVASCULAR DISEASE): Primary | ICD-10-CM

## 2023-03-21 PROCEDURE — G8417 CALC BMI ABV UP PARAM F/U: HCPCS | Performed by: INTERNAL MEDICINE

## 2023-03-21 PROCEDURE — G8428 CUR MEDS NOT DOCUMENT: HCPCS | Performed by: INTERNAL MEDICINE

## 2023-03-21 PROCEDURE — G8484 FLU IMMUNIZE NO ADMIN: HCPCS | Performed by: INTERNAL MEDICINE

## 2023-03-21 PROCEDURE — 99214 OFFICE O/P EST MOD 30 MIN: CPT | Performed by: INTERNAL MEDICINE

## 2023-03-21 PROCEDURE — 1036F TOBACCO NON-USER: CPT | Performed by: INTERNAL MEDICINE

## 2023-03-21 PROCEDURE — 3017F COLORECTAL CA SCREEN DOC REV: CPT | Performed by: INTERNAL MEDICINE

## 2023-03-21 PROCEDURE — 1123F ACP DISCUSS/DSCN MKR DOCD: CPT | Performed by: INTERNAL MEDICINE

## 2023-03-21 PROCEDURE — 3075F SYST BP GE 130 - 139MM HG: CPT | Performed by: INTERNAL MEDICINE

## 2023-03-21 PROCEDURE — 3079F DIAST BP 80-89 MM HG: CPT | Performed by: INTERNAL MEDICINE

## 2023-03-21 RX ORDER — METOPROLOL SUCCINATE 100 MG/1
100 TABLET, EXTENDED RELEASE ORAL DAILY
Qty: 90 TABLET | Refills: 3 | Status: SHIPPED | OUTPATIENT
Start: 2023-03-21

## 2023-03-21 RX ORDER — ROSUVASTATIN CALCIUM 20 MG/1
20 TABLET, COATED ORAL DAILY
Qty: 90 TABLET | Refills: 3 | Status: SHIPPED | OUTPATIENT
Start: 2023-03-21

## 2023-03-21 NOTE — PROGRESS NOTES
[facility] CARDIOLOGY  [address] [facility], [facility], [address] [place] [address]  PHONE: [phone]        23        NAME:  Taylor Kumar  : 1955  MRN: [id number]     CHIEF COMPLAINT:    Hypertension, Coronary Artery Disease, and Cardiomyopathy      SUBJECTIVE:       No chest pain or palpitation or dizziness. Active. Exercising. Medications were all reviewed with the patient today and updated as necessary. Current Outpatient Medications   Medication Sig    metoprolol succinate (TOPROL XL) 100 MG extended release tablet Take 1 tablet by mouth daily    citalopram (CELEXA) 40 MG tablet Take 1 tablet by mouth daily    amphetamine-dextroamphetamine (ADDERALL, 20MG,) 20 MG tablet Take 1 tablet by mouth 2 times daily for 30 days. sildenafil (VIAGRA) 100 MG tablet Take 1 tablet by mouth as needed for Erectile Dysfunction    pantoprazole (PROTONIX) 40 MG tablet Take 1 tablet by mouth daily    rosuvastatin (CRESTOR) 20 MG tablet Take 1 tablet by mouth daily    tamsulosin (FLOMAX) 0.4 MG capsule Take 1 capsule by mouth daily    finasteride (PROSCAR) 5 MG tablet Take 1 tablet by mouth daily    dilTIAZem (CARDIZEM) 30 MG tablet 30 mg 30 mg in AM and 30 mg PM    aspirin 81 MG EC tablet Take 81 mg by mouth daily    Evolocumab (REPATHA SURECLICK) 943 MG/ML SOAJ Inject 140 mg into the skin every 14 days    fexofenadine (ALLEGRA) 180 MG tablet Take by mouth as needed    fluticasone (FLONASE) 50 MCG/ACT nasal spray 2 sprays by Nasal route daily    ranolazine (RANEXA) 500 MG extended release tablet Take 500 mg by mouth 2 times daily     No current facility-administered medications for this visit.         Allergies   Allergen Reactions    Codeine Nausea And Vomiting           PHYSICAL EXAM:     Wt Readings from Last 3 Encounters:   23 215 lb (97.5 kg)   22 227 lb (103 kg)   22 229 lb (103.9 kg)     BP Readings from Last 3 Encounters:   23 130/80   22 120/66   22

## 2023-09-07 ENCOUNTER — APPOINTMENT (RX ONLY)
Dept: URBAN - METROPOLITAN AREA CLINIC 329 | Facility: CLINIC | Age: 68
Setting detail: DERMATOLOGY
End: 2023-09-07

## 2023-09-07 DIAGNOSIS — D18.0 HEMANGIOMA: ICD-10-CM

## 2023-09-07 DIAGNOSIS — Z12.83 ENCOUNTER FOR SCREENING FOR MALIGNANT NEOPLASM OF SKIN: ICD-10-CM

## 2023-09-07 DIAGNOSIS — Z71.89 OTHER SPECIFIED COUNSELING: ICD-10-CM

## 2023-09-07 DIAGNOSIS — L82.1 OTHER SEBORRHEIC KERATOSIS: ICD-10-CM

## 2023-09-07 DIAGNOSIS — D22 MELANOCYTIC NEVI: ICD-10-CM

## 2023-09-07 PROBLEM — D18.01 HEMANGIOMA OF SKIN AND SUBCUTANEOUS TISSUE: Status: ACTIVE | Noted: 2023-09-07

## 2023-09-07 PROBLEM — D22.5 MELANOCYTIC NEVI OF TRUNK: Status: ACTIVE | Noted: 2023-09-07

## 2023-09-07 PROCEDURE — ? SUNSCREEN RECOMMENDATIONS

## 2023-09-07 PROCEDURE — ? COUNSELING

## 2023-09-07 PROCEDURE — ? FULL BODY SKIN EXAM

## 2023-09-07 PROCEDURE — 99203 OFFICE O/P NEW LOW 30 MIN: CPT

## 2023-09-07 ASSESSMENT — LOCATION SIMPLE DESCRIPTION DERM
LOCATION SIMPLE: LEFT CALF
LOCATION SIMPLE: LEFT LOWER BACK
LOCATION SIMPLE: CHEST
LOCATION SIMPLE: LEFT CALF
LOCATION SIMPLE: RIGHT BREAST
LOCATION SIMPLE: RIGHT UPPER BACK
LOCATION SIMPLE: RIGHT UPPER BACK
LOCATION SIMPLE: LEFT FOREARM
LOCATION SIMPLE: LEFT UPPER ARM
LOCATION SIMPLE: RIGHT CALF
LOCATION SIMPLE: ABDOMEN
LOCATION SIMPLE: ABDOMEN
LOCATION SIMPLE: LEFT UPPER ARM
LOCATION SIMPLE: LEFT FOREARM
LOCATION SIMPLE: LEFT UPPER BACK
LOCATION SIMPLE: LEFT UPPER BACK

## 2023-09-07 ASSESSMENT — LOCATION DETAILED DESCRIPTION DERM
LOCATION DETAILED: LEFT DISTAL CALF
LOCATION DETAILED: RIGHT INFERIOR UPPER BACK
LOCATION DETAILED: LEFT RIB CAGE
LOCATION DETAILED: LEFT ANTERIOR PROXIMAL UPPER ARM
LOCATION DETAILED: PERIUMBILICAL SKIN
LOCATION DETAILED: RIGHT MEDIAL SUPERIOR CHEST
LOCATION DETAILED: RIGHT SUPERIOR MEDIAL UPPER BACK
LOCATION DETAILED: LEFT SUPERIOR UPPER BACK
LOCATION DETAILED: LEFT DISTAL CALF
LOCATION DETAILED: LEFT INFERIOR UPPER BACK
LOCATION DETAILED: LEFT MEDIAL INFERIOR CHEST
LOCATION DETAILED: RIGHT DISTAL CALF
LOCATION DETAILED: LEFT PROXIMAL POSTERIOR UPPER ARM
LOCATION DETAILED: EPIGASTRIC SKIN
LOCATION DETAILED: PERIUMBILICAL SKIN
LOCATION DETAILED: RIGHT INFERIOR LATERAL UPPER BACK
LOCATION DETAILED: LEFT MEDIAL UPPER BACK
LOCATION DETAILED: LEFT VENTRAL PROXIMAL FOREARM
LOCATION DETAILED: LEFT INFERIOR LATERAL MIDBACK
LOCATION DETAILED: RIGHT INFRAMAMMARY CREASE (INNER QUADRANT)
LOCATION DETAILED: LEFT DISTAL POSTERIOR UPPER ARM
LOCATION DETAILED: LEFT VENTRAL PROXIMAL FOREARM
LOCATION DETAILED: LEFT MEDIAL SUPERIOR CHEST

## 2023-09-07 ASSESSMENT — LOCATION ZONE DERM
LOCATION ZONE: LEG
LOCATION ZONE: TRUNK
LOCATION ZONE: LEG
LOCATION ZONE: ARM
LOCATION ZONE: ARM
LOCATION ZONE: TRUNK

## 2023-09-07 NOTE — PROCEDURE: MIPS QUALITY
(2) good, crying
Detail Level: Detailed
Quality 110: Preventive Care And Screening: Influenza Immunization: Influenza immunization was not ordered or administered, reason not given

## 2023-09-21 ENCOUNTER — OFFICE VISIT (OUTPATIENT)
Age: 68
End: 2023-09-21
Payer: MEDICARE

## 2023-09-21 VITALS
HEART RATE: 60 BPM | DIASTOLIC BLOOD PRESSURE: 62 MMHG | WEIGHT: 218 LBS | BODY MASS INDEX: 29.53 KG/M2 | SYSTOLIC BLOOD PRESSURE: 118 MMHG | HEIGHT: 72 IN

## 2023-09-21 DIAGNOSIS — I10 PRIMARY HYPERTENSION: ICD-10-CM

## 2023-09-21 DIAGNOSIS — I42.1 HOCM (HYPERTROPHIC OBSTRUCTIVE CARDIOMYOPATHY) (HCC): ICD-10-CM

## 2023-09-21 DIAGNOSIS — I25.10 ASCVD (ARTERIOSCLEROTIC CARDIOVASCULAR DISEASE): Primary | ICD-10-CM

## 2023-09-21 PROCEDURE — 1123F ACP DISCUSS/DSCN MKR DOCD: CPT | Performed by: INTERNAL MEDICINE

## 2023-09-21 PROCEDURE — 3074F SYST BP LT 130 MM HG: CPT | Performed by: INTERNAL MEDICINE

## 2023-09-21 PROCEDURE — 99214 OFFICE O/P EST MOD 30 MIN: CPT | Performed by: INTERNAL MEDICINE

## 2023-09-21 PROCEDURE — G8428 CUR MEDS NOT DOCUMENT: HCPCS | Performed by: INTERNAL MEDICINE

## 2023-09-21 PROCEDURE — 3017F COLORECTAL CA SCREEN DOC REV: CPT | Performed by: INTERNAL MEDICINE

## 2023-09-21 PROCEDURE — 1036F TOBACCO NON-USER: CPT | Performed by: INTERNAL MEDICINE

## 2023-09-21 PROCEDURE — 3078F DIAST BP <80 MM HG: CPT | Performed by: INTERNAL MEDICINE

## 2023-09-21 PROCEDURE — G8417 CALC BMI ABV UP PARAM F/U: HCPCS | Performed by: INTERNAL MEDICINE

## 2023-09-21 NOTE — PROGRESS NOTES
[facility] CARDIOLOGY  [address] [facility], [address]  PHONE: [phone]        23        NAME:  Jimmy Clayton  : 1955  MRN: [id number]     ASCVD  HTN  HOCM  Thrombocytopenia  Hx Pud and GIB,     CHIEF COMPLAINT:    Coronary Artery Disease and Cardiomyopathy      SUBJECTIVE:       No chest pain or palpitation or dizziness. Medications were all reviewed with the patient today and updated as necessary. Current Outpatient Medications   Medication Sig    metoprolol succinate (TOPROL XL) 100 MG extended release tablet Take 1 tablet by mouth daily    rosuvastatin (CRESTOR) 20 MG tablet Take 1 tablet by mouth daily    citalopram (CELEXA) 40 MG tablet Take 1 tablet by mouth daily    sildenafil (VIAGRA) 100 MG tablet Take 1 tablet by mouth as needed for Erectile Dysfunction    pantoprazole (PROTONIX) 40 MG tablet Take 1 tablet by mouth daily    tamsulosin (FLOMAX) 0.4 MG capsule Take 1 capsule by mouth daily    finasteride (PROSCAR) 5 MG tablet Take 1 tablet by mouth daily    dilTIAZem (CARDIZEM) 30 MG tablet 1 tablet 30 mg in AM and 30 mg PM    aspirin 81 MG EC tablet Take 1 tablet by mouth daily    Evolocumab (REPATHA SURECLICK) 818 MG/ML SOAJ Inject 140 mg into the skin every 14 days    fexofenadine (ALLEGRA) 180 MG tablet Take by mouth as needed    fluticasone (FLONASE) 50 MCG/ACT nasal spray 2 sprays by Nasal route daily    ranolazine (RANEXA) 500 MG extended release tablet Take 1 tablet by mouth 2 times daily     No current facility-administered medications for this visit.         Allergies   Allergen Reactions    Codeine Nausea And Vomiting           PHYSICAL EXAM:     Wt Readings from Last 3 Encounters:   23 218 lb (98.9 kg)   23 215 lb (97.5 kg)   22 227 lb (103 kg)     BP Readings from Last 3 Encounters:   23 118/62   23 130/80   22 120/66       /62   Pulse 60   Ht 6' (1.829 m)   Wt 218 lb (98.9 kg)   BMI

## 2024-03-25 ENCOUNTER — OFFICE VISIT (OUTPATIENT)
Age: 69
End: 2024-03-25
Payer: MEDICARE

## 2024-03-25 VITALS
HEART RATE: 61 BPM | BODY MASS INDEX: 30.75 KG/M2 | WEIGHT: 227 LBS | DIASTOLIC BLOOD PRESSURE: 70 MMHG | SYSTOLIC BLOOD PRESSURE: 136 MMHG | HEIGHT: 72 IN

## 2024-03-25 DIAGNOSIS — I25.10 ASCVD (ARTERIOSCLEROTIC CARDIOVASCULAR DISEASE): Primary | ICD-10-CM

## 2024-03-25 DIAGNOSIS — I42.1 HOCM (HYPERTROPHIC OBSTRUCTIVE CARDIOMYOPATHY) (HCC): ICD-10-CM

## 2024-03-25 DIAGNOSIS — I10 PRIMARY HYPERTENSION: ICD-10-CM

## 2024-03-25 PROCEDURE — 99214 OFFICE O/P EST MOD 30 MIN: CPT | Performed by: INTERNAL MEDICINE

## 2024-03-25 PROCEDURE — G8484 FLU IMMUNIZE NO ADMIN: HCPCS | Performed by: INTERNAL MEDICINE

## 2024-03-25 PROCEDURE — G8428 CUR MEDS NOT DOCUMENT: HCPCS | Performed by: INTERNAL MEDICINE

## 2024-03-25 PROCEDURE — 3017F COLORECTAL CA SCREEN DOC REV: CPT | Performed by: INTERNAL MEDICINE

## 2024-03-25 PROCEDURE — 1036F TOBACCO NON-USER: CPT | Performed by: INTERNAL MEDICINE

## 2024-03-25 PROCEDURE — 93000 ELECTROCARDIOGRAM COMPLETE: CPT | Performed by: INTERNAL MEDICINE

## 2024-03-25 PROCEDURE — 1123F ACP DISCUSS/DSCN MKR DOCD: CPT | Performed by: INTERNAL MEDICINE

## 2024-03-25 PROCEDURE — G8417 CALC BMI ABV UP PARAM F/U: HCPCS | Performed by: INTERNAL MEDICINE

## 2024-03-25 PROCEDURE — 3075F SYST BP GE 130 - 139MM HG: CPT | Performed by: INTERNAL MEDICINE

## 2024-03-25 PROCEDURE — 3078F DIAST BP <80 MM HG: CPT | Performed by: INTERNAL MEDICINE

## 2024-03-25 RX ORDER — DISOPYRAMIDE PHOSPHATE 100 MG/1
100 CAPSULE ORAL 4 TIMES DAILY
Qty: 120 CAPSULE | Refills: 3 | Status: SHIPPED | OUTPATIENT
Start: 2024-03-25

## 2024-03-25 RX ORDER — EVOLOCUMAB 140 MG/ML
140 INJECTION, SOLUTION SUBCUTANEOUS
Qty: 6 ADJUSTABLE DOSE PRE-FILLED PEN SYRINGE | Refills: 3 | Status: SHIPPED | OUTPATIENT
Start: 2024-03-25

## 2024-03-25 NOTE — PROGRESS NOTES
03/25/24 136/70   09/21/23 118/62   03/21/23 130/80       /70   Pulse 61   Ht 1.829 m (6')   Wt 103 kg (227 lb)   BMI 30.79 kg/m²     Physical Exam  Vitals reviewed.   HENT:      Head: Normocephalic and atraumatic.   Eyes:      Extraocular Movements: Extraocular movements intact.      Pupils: Pupils are equal, round, and reactive to light.   Cardiovascular:      Rate and Rhythm: Normal rate.      Heart sounds: Normal heart sounds.   Pulmonary:      Effort: Pulmonary effort is normal.      Breath sounds: Normal breath sounds.   Abdominal:      General: Abdomen is flat.      Palpations: Abdomen is soft. There is no mass.   Musculoskeletal:         General: Normal range of motion.      Cervical back: Normal range of motion.   Skin:     General: Skin is warm and dry.   Neurological:      General: No focal deficit present.      Mental Status: He is alert and oriented to person, place, and time.   Psychiatric:         Mood and Affect: Mood normal.           RECENT LABS AND RECORDS REVIEW    Echo last year LVOT 120 mmHg gradient    EKG:  Sinus Rhythm -occasional PAC    # PACs = 1.  Voltage criteria for LVH (R(aVF) exceeds 2.00 mV) -Voltage criteria w/o ST/T abnormality may be normal.      No results found for any visits on 03/25/24.   ASSESSMENT and PLAN    Arthur was seen today for other.    Diagnoses and all orders for this visit:    ASCVD (arteriosclerotic cardiovascular disease)  -     EKG 12 Lead - Clinic Performed    Primary hypertension    HOCM (hypertrophic obstructive cardiomyopathy) (HCC)  -     Echo (TTE) complete (PRN contrast/bubble/strain/3D); Future    Other orders  -     Evolocumab (REPATHA SURECLICK) 140 MG/ML SOAJ; Inject 140 mg into the skin every 14 days  -     disopyramide (NORPACE) 100 MG capsule; Take 1 capsule by mouth 4 times daily       Stop Ranexa  Start Norpace 100 id  Check echo  Consider Camzyous    Return for review results.       LELE ESCOBAR MD  3/25/2024  8:45 AM

## 2024-04-03 ENCOUNTER — CLINICAL DOCUMENTATION (OUTPATIENT)
Age: 69
End: 2024-04-03

## 2024-04-04 NOTE — PROGRESS NOTES
Disopyramide Phosphate 100MG capsules    PA submitted.    Approved today  PA Case: [id number], Status: Approved, Coverage Starts on: 1/1/2024 12:00:00 AM, Coverage Ends on: 12/31/2024 12:00:00 AM.    Repatha SureClick 140MG/ML auto-injectors    PA submitted via phone. Patient not found on Cover My Meds.   Case # [id number]

## 2024-04-30 ENCOUNTER — OFFICE VISIT (OUTPATIENT)
Age: 69
End: 2024-04-30
Payer: MEDICARE

## 2024-04-30 VITALS
HEART RATE: 56 BPM | DIASTOLIC BLOOD PRESSURE: 70 MMHG | HEIGHT: 71 IN | BODY MASS INDEX: 31.64 KG/M2 | SYSTOLIC BLOOD PRESSURE: 138 MMHG | WEIGHT: 226 LBS

## 2024-04-30 DIAGNOSIS — I25.10 ASCVD (ARTERIOSCLEROTIC CARDIOVASCULAR DISEASE): ICD-10-CM

## 2024-04-30 DIAGNOSIS — I42.1 HOCM (HYPERTROPHIC OBSTRUCTIVE CARDIOMYOPATHY) (HCC): Primary | ICD-10-CM

## 2024-04-30 DIAGNOSIS — I10 PRIMARY HYPERTENSION: ICD-10-CM

## 2024-04-30 PROCEDURE — G8417 CALC BMI ABV UP PARAM F/U: HCPCS | Performed by: INTERNAL MEDICINE

## 2024-04-30 PROCEDURE — 99214 OFFICE O/P EST MOD 30 MIN: CPT | Performed by: INTERNAL MEDICINE

## 2024-04-30 PROCEDURE — 1036F TOBACCO NON-USER: CPT | Performed by: INTERNAL MEDICINE

## 2024-04-30 PROCEDURE — G8428 CUR MEDS NOT DOCUMENT: HCPCS | Performed by: INTERNAL MEDICINE

## 2024-04-30 PROCEDURE — 3078F DIAST BP <80 MM HG: CPT | Performed by: INTERNAL MEDICINE

## 2024-04-30 PROCEDURE — 1123F ACP DISCUSS/DSCN MKR DOCD: CPT | Performed by: INTERNAL MEDICINE

## 2024-04-30 PROCEDURE — 3017F COLORECTAL CA SCREEN DOC REV: CPT | Performed by: INTERNAL MEDICINE

## 2024-04-30 PROCEDURE — 3075F SYST BP GE 130 - 139MM HG: CPT | Performed by: INTERNAL MEDICINE

## 2024-04-30 PROCEDURE — 93000 ELECTROCARDIOGRAM COMPLETE: CPT | Performed by: INTERNAL MEDICINE

## 2024-04-30 RX ORDER — DEXTROAMPHETAMINE SACCHARATE, AMPHETAMINE ASPARTATE, DEXTROAMPHETAMINE SULFATE AND AMPHETAMINE SULFATE 2.5; 2.5; 2.5; 2.5 MG/1; MG/1; MG/1; MG/1
10 TABLET ORAL DAILY
COMMUNITY

## 2024-04-30 RX ORDER — EVOLOCUMAB 140 MG/ML
140 INJECTION, SOLUTION SUBCUTANEOUS
Qty: 6 ADJUSTABLE DOSE PRE-FILLED PEN SYRINGE | Refills: 3 | Status: SHIPPED | OUTPATIENT
Start: 2024-04-30

## 2024-04-30 RX ORDER — METOPROLOL SUCCINATE 100 MG/1
100 TABLET, EXTENDED RELEASE ORAL DAILY
Qty: 90 TABLET | Refills: 3 | Status: SHIPPED | OUTPATIENT
Start: 2024-04-30

## 2024-04-30 RX ORDER — ROSUVASTATIN CALCIUM 20 MG/1
20 TABLET, COATED ORAL DAILY
Qty: 90 TABLET | Refills: 3 | Status: SHIPPED | OUTPATIENT
Start: 2024-04-30

## 2024-04-30 NOTE — PROGRESS NOTES
[facility] CARDIOLOGY  [address] [place] [address], [address]  [place] [address]  PHONE: [phone]        24        NAME:  Arthur Maradiaga  : 1955  MRN: [id number]     ASCVD, prior pci  HTN  HOCM, prior alcohol septal ablation  Thrombocytopenia  Hx Pud and GIB, 2013    CHIEF COMPLAINT:    Coronary Artery Disease, Atrial Fibrillation, and Hypertension      SUBJECTIVE:     Some inc exertional.  More emotional stress.  Angina better after add of Norpace.       Medications were all reviewed with the patient today and updated as necessary.   Current Outpatient Medications   Medication Sig    amphetamine-dextroamphetamine (ADDERALL) 10 MG tablet Take 1 tablet by mouth daily. Max Daily Amount: 10 mg    Evolocumab (REPATHA SURECLICK) 140 MG/ML SOAJ Inject 140 mg into the skin every 14 days    disopyramide (NORPACE) 100 MG capsule Take 1 capsule by mouth 4 times daily    metoprolol succinate (TOPROL XL) 100 MG extended release tablet Take 1 tablet by mouth daily    rosuvastatin (CRESTOR) 20 MG tablet Take 1 tablet by mouth daily    citalopram (CELEXA) 40 MG tablet Take 1 tablet by mouth daily    sildenafil (VIAGRA) 100 MG tablet Take 1 tablet by mouth as needed for Erectile Dysfunction    pantoprazole (PROTONIX) 40 MG tablet Take 1 tablet by mouth daily    tamsulosin (FLOMAX) 0.4 MG capsule Take 1 capsule by mouth daily    finasteride (PROSCAR) 5 MG tablet Take 1 tablet by mouth daily    dilTIAZem (CARDIZEM) 30 MG tablet 1 tablet 30 mg in AM and 30 mg PM    aspirin 81 MG EC tablet Take 1 tablet by mouth daily    fexofenadine (ALLEGRA) 180 MG tablet Take by mouth as needed    fluticasone (FLONASE) 50 MCG/ACT nasal spray 2 sprays by Nasal route daily     No current facility-administered medications for this visit.        Allergies   Allergen Reactions    Codeine Nausea And Vomiting           PHYSICAL EXAM:     Wt Readings from Last 3 Encounters:   24 102.5 kg (226 lb)   24 103 kg (227 lb)

## 2024-05-18 ENCOUNTER — CLINICAL DOCUMENTATION (OUTPATIENT)
Age: 69
End: 2024-05-18

## 2024-09-11 ENCOUNTER — APPOINTMENT (RX ONLY)
Dept: URBAN - METROPOLITAN AREA CLINIC 329 | Facility: CLINIC | Age: 69
Setting detail: DERMATOLOGY
End: 2024-09-11

## 2024-09-11 DIAGNOSIS — L57.8 OTHER SKIN CHANGES DUE TO CHRONIC EXPOSURE TO NONIONIZING RADIATION: ICD-10-CM

## 2024-09-11 DIAGNOSIS — L81.4 OTHER MELANIN HYPERPIGMENTATION: ICD-10-CM

## 2024-09-11 DIAGNOSIS — D18.0 HEMANGIOMA: ICD-10-CM

## 2024-09-11 DIAGNOSIS — L82.1 OTHER SEBORRHEIC KERATOSIS: ICD-10-CM

## 2024-09-11 DIAGNOSIS — D22 MELANOCYTIC NEVI: ICD-10-CM

## 2024-09-11 PROBLEM — D22.5 MELANOCYTIC NEVI OF TRUNK: Status: ACTIVE | Noted: 2024-09-11

## 2024-09-11 PROBLEM — D18.01 HEMANGIOMA OF SKIN AND SUBCUTANEOUS TISSUE: Status: ACTIVE | Noted: 2024-09-11

## 2024-09-11 PROBLEM — D22.71 MELANOCYTIC NEVI OF RIGHT LOWER LIMB, INCLUDING HIP: Status: ACTIVE | Noted: 2024-09-11

## 2024-09-11 PROBLEM — D22.72 MELANOCYTIC NEVI OF LEFT LOWER LIMB, INCLUDING HIP: Status: ACTIVE | Noted: 2024-09-11

## 2024-09-11 PROBLEM — D22.61 MELANOCYTIC NEVI OF RIGHT UPPER LIMB, INCLUDING SHOULDER: Status: ACTIVE | Noted: 2024-09-11

## 2024-09-11 PROCEDURE — ? COUNSELING

## 2024-09-11 PROCEDURE — ? TREATMENT REGIMEN

## 2024-09-11 PROCEDURE — 99213 OFFICE O/P EST LOW 20 MIN: CPT

## 2024-09-11 PROCEDURE — ? ADDITIONAL NOTES

## 2024-09-11 ASSESSMENT — LOCATION SIMPLE DESCRIPTION DERM
LOCATION SIMPLE: CHEST
LOCATION SIMPLE: LEFT POSTERIOR THIGH
LOCATION SIMPLE: LEFT ANKLE
LOCATION SIMPLE: ABDOMEN
LOCATION SIMPLE: LEFT CHEEK
LOCATION SIMPLE: UPPER BACK
LOCATION SIMPLE: ANTERIOR SCALP
LOCATION SIMPLE: RIGHT UPPER BACK
LOCATION SIMPLE: RIGHT FOREARM
LOCATION SIMPLE: RIGHT UPPER ARM
LOCATION SIMPLE: LEFT CALF
LOCATION SIMPLE: LEFT UPPER BACK
LOCATION SIMPLE: LEFT UPPER ARM
LOCATION SIMPLE: RIGHT THIGH
LOCATION SIMPLE: RIGHT CLAVICULAR SKIN
LOCATION SIMPLE: LEFT FOREHEAD

## 2024-09-11 ASSESSMENT — LOCATION DETAILED DESCRIPTION DERM
LOCATION DETAILED: RIGHT PROXIMAL DORSAL FOREARM
LOCATION DETAILED: UPPER STERNUM
LOCATION DETAILED: SUPERIOR THORACIC SPINE
LOCATION DETAILED: LEFT PROXIMAL POSTERIOR THIGH
LOCATION DETAILED: LEFT INFERIOR UPPER BACK
LOCATION DETAILED: LEFT SUPERIOR LATERAL FOREHEAD
LOCATION DETAILED: LEFT DISTAL POSTERIOR THIGH
LOCATION DETAILED: MID-FRONTAL SCALP
LOCATION DETAILED: RIGHT VENTRAL DISTAL FOREARM
LOCATION DETAILED: RIGHT ANTERIOR DISTAL THIGH
LOCATION DETAILED: LEFT DISTAL CALF
LOCATION DETAILED: LEFT ANTERIOR DISTAL UPPER ARM
LOCATION DETAILED: LEFT CENTRAL MALAR CHEEK
LOCATION DETAILED: RIGHT SUPERIOR MEDIAL UPPER BACK
LOCATION DETAILED: RIGHT CLAVICULAR SKIN
LOCATION DETAILED: RIGHT ANTERIOR DISTAL UPPER ARM
LOCATION DETAILED: LEFT ANKLE
LOCATION DETAILED: EPIGASTRIC SKIN

## 2024-09-11 ASSESSMENT — LOCATION ZONE DERM
LOCATION ZONE: SCALP
LOCATION ZONE: ARM
LOCATION ZONE: FACE
LOCATION ZONE: TRUNK
LOCATION ZONE: LEG

## 2024-09-11 NOTE — PROCEDURE: ADDITIONAL NOTES
Additional Notes: The provider and patient discussed prescribing a cream for treatment. The patient denies treatment today.
Detail Level: Zone
Render Risk Assessment In Note?: no

## 2024-11-14 ENCOUNTER — OFFICE VISIT (OUTPATIENT)
Age: 69
End: 2024-11-14

## 2024-11-14 VITALS
SYSTOLIC BLOOD PRESSURE: 138 MMHG | BODY MASS INDEX: 31.15 KG/M2 | WEIGHT: 230 LBS | HEIGHT: 72 IN | DIASTOLIC BLOOD PRESSURE: 70 MMHG | HEART RATE: 64 BPM

## 2024-11-14 DIAGNOSIS — I10 PRIMARY HYPERTENSION: ICD-10-CM

## 2024-11-14 DIAGNOSIS — I25.10 ASCVD (ARTERIOSCLEROTIC CARDIOVASCULAR DISEASE): ICD-10-CM

## 2024-11-14 DIAGNOSIS — I42.1 HOCM (HYPERTROPHIC OBSTRUCTIVE CARDIOMYOPATHY) (HCC): Primary | ICD-10-CM

## 2024-11-14 RX ORDER — SEMAGLUTIDE 0.68 MG/ML
INJECTION, SOLUTION SUBCUTANEOUS
Qty: 3 ML | Refills: 2 | Status: SHIPPED | OUTPATIENT
Start: 2024-11-14 | End: 2025-01-12

## 2024-11-14 NOTE — PROGRESS NOTES
[facility] CARDIOLOGY  [address] [place] [address], [address]  [place] [address]  PHONE: [phone]        24        NAME:  Arthur Maradiaga  : 1955  MRN: [id number]     ASCVD, prior pci  HTN  HOCM, prior alcohol septal ablation  Thrombocytopenia  Hx Pud and GIB,     CHIEF COMPLAINT:    Coronary Artery Disease      SUBJECTIVE:       No chest pain or palpitation or dizziness.     Medications were all reviewed with the patient today and updated as necessary.   Current Outpatient Medications   Medication Sig    Semaglutide,0.25 or 0.5MG/DOS, (OZEMPIC, 0.25 OR 0.5 MG/DOSE,) 2 MG/3ML SOPN Inject 0.25 Doses into the skin every 7 days for 30 days, THEN 0.5 Doses every 7 days.    amphetamine-dextroamphetamine (ADDERALL) 10 MG tablet Take 1 tablet by mouth daily.    dilTIAZem (CARDIZEM) 30 MG tablet Take 1 tablet by mouth in the morning and at bedtime 30 mg in AM and 30 mg PM    Evolocumab (REPATHELOINA HALEYICK) 140 MG/ML SOAJ Inject 140 mg into the skin every 14 days    metoprolol succinate (TOPROL XL) 100 MG extended release tablet Take 1 tablet by mouth daily    rosuvastatin (CRESTOR) 20 MG tablet Take 1 tablet by mouth daily    disopyramide (NORPACE) 100 MG capsule Take 1 capsule by mouth 4 times daily    citalopram (CELEXA) 40 MG tablet Take 1 tablet by mouth daily    sildenafil (VIAGRA) 100 MG tablet Take 1 tablet by mouth as needed for Erectile Dysfunction    pantoprazole (PROTONIX) 40 MG tablet Take 1 tablet by mouth daily    tamsulosin (FLOMAX) 0.4 MG capsule Take 1 capsule by mouth daily    finasteride (PROSCAR) 5 MG tablet Take 1 tablet by mouth daily    aspirin 81 MG EC tablet Take 1 tablet by mouth daily    fexofenadine (ALLEGRA) 180 MG tablet Take by mouth as needed    fluticasone (FLONASE) 50 MCG/ACT nasal spray 2 sprays by Nasal route daily     No current facility-administered medications for this visit.        Allergies   Allergen Reactions    Codeine Nausea And Vomiting

## 2025-05-02 DIAGNOSIS — I25.10 ASCVD (ARTERIOSCLEROTIC CARDIOVASCULAR DISEASE): ICD-10-CM

## 2025-05-02 RX ORDER — ROSUVASTATIN CALCIUM 20 MG/1
20 TABLET, COATED ORAL DAILY
Qty: 90 TABLET | Refills: 1 | Status: SHIPPED | OUTPATIENT
Start: 2025-05-02

## 2025-05-02 NOTE — TELEPHONE ENCOUNTER
Requested Prescriptions     Pending Prescriptions Disp Refills    rosuvastatin (CRESTOR) 20 MG tablet [Pharmacy Med Name: ROSUVASTATIN CALCIUM 20 MG TAB] 90 tablet 1     Sig: TAKE ONE TABLET BY MOUTH EVERY DAY     Verified rx. Last OV 11/14/24. Erx to pharm on file.

## 2025-05-16 DIAGNOSIS — I25.10 ASCVD (ARTERIOSCLEROTIC CARDIOVASCULAR DISEASE): ICD-10-CM

## 2025-05-16 DIAGNOSIS — I10 PRIMARY HYPERTENSION: ICD-10-CM

## 2025-05-16 NOTE — TELEPHONE ENCOUNTER
Requested Prescriptions     Pending Prescriptions Disp Refills    metoprolol succinate (TOPROL XL) 100 MG extended release tablet 90 tablet 3     Sig: Take 1 tablet by mouth daily     Verified rx. Last OV 11/14/24. Erx to pharm on file.

## 2025-05-19 RX ORDER — METOPROLOL SUCCINATE 100 MG/1
100 TABLET, EXTENDED RELEASE ORAL DAILY
Qty: 90 TABLET | Refills: 3 | Status: SHIPPED | OUTPATIENT
Start: 2025-05-19

## 2025-05-21 ENCOUNTER — OFFICE VISIT (OUTPATIENT)
Age: 70
End: 2025-05-21
Payer: MEDICARE

## 2025-05-21 VITALS
WEIGHT: 238 LBS | DIASTOLIC BLOOD PRESSURE: 70 MMHG | HEART RATE: 64 BPM | BODY MASS INDEX: 32.23 KG/M2 | HEIGHT: 72 IN | SYSTOLIC BLOOD PRESSURE: 138 MMHG

## 2025-05-21 DIAGNOSIS — I10 PRIMARY HYPERTENSION: ICD-10-CM

## 2025-05-21 DIAGNOSIS — I42.1 HOCM (HYPERTROPHIC OBSTRUCTIVE CARDIOMYOPATHY) (HCC): ICD-10-CM

## 2025-05-21 DIAGNOSIS — I25.10 ASCVD (ARTERIOSCLEROTIC CARDIOVASCULAR DISEASE): Primary | ICD-10-CM

## 2025-05-21 PROCEDURE — 3075F SYST BP GE 130 - 139MM HG: CPT | Performed by: INTERNAL MEDICINE

## 2025-05-21 PROCEDURE — 1036F TOBACCO NON-USER: CPT | Performed by: INTERNAL MEDICINE

## 2025-05-21 PROCEDURE — 99214 OFFICE O/P EST MOD 30 MIN: CPT | Performed by: INTERNAL MEDICINE

## 2025-05-21 PROCEDURE — 1126F AMNT PAIN NOTED NONE PRSNT: CPT | Performed by: INTERNAL MEDICINE

## 2025-05-21 PROCEDURE — 3017F COLORECTAL CA SCREEN DOC REV: CPT | Performed by: INTERNAL MEDICINE

## 2025-05-21 PROCEDURE — G8417 CALC BMI ABV UP PARAM F/U: HCPCS | Performed by: INTERNAL MEDICINE

## 2025-05-21 PROCEDURE — 3078F DIAST BP <80 MM HG: CPT | Performed by: INTERNAL MEDICINE

## 2025-05-21 PROCEDURE — G8428 CUR MEDS NOT DOCUMENT: HCPCS | Performed by: INTERNAL MEDICINE

## 2025-05-21 PROCEDURE — 1123F ACP DISCUSS/DSCN MKR DOCD: CPT | Performed by: INTERNAL MEDICINE

## 2025-05-21 RX ORDER — DISOPYRAMIDE PHOSPHATE 100 MG/1
100 CAPSULE ORAL 4 TIMES DAILY
Qty: 360 CAPSULE | Refills: 3 | Status: SHIPPED | OUTPATIENT
Start: 2025-05-21

## 2025-05-21 RX ORDER — ROSUVASTATIN CALCIUM 20 MG/1
20 TABLET, COATED ORAL DAILY
Qty: 90 TABLET | Refills: 3 | Status: SHIPPED | OUTPATIENT
Start: 2025-05-21

## 2025-05-21 RX ORDER — EVOLOCUMAB 140 MG/ML
140 INJECTION, SOLUTION SUBCUTANEOUS
Qty: 6 ADJUSTABLE DOSE PRE-FILLED PEN SYRINGE | Refills: 3 | Status: SHIPPED | OUTPATIENT
Start: 2025-05-21

## 2025-05-21 NOTE — PROGRESS NOTES
[facility] CARDIOLOGY  [address] [place] [address], [address]  [place] [address]  PHONE: [phone]        25        NAME:  Arthur Maradiaga  : 1955  MRN: [id number]     ASCVD, prior pci  HTN  HOCM, prior alcohol septal ablation  Thrombocytopenia  Hx Pud and GIB, 2013    CHIEF COMPLAINT:    Cardiomyopathy and Coronary Artery Disease      SUBJECTIVE:       + angina w/ moderate exertion.  Reduced QOL.     Medications were all reviewed with the patient today and updated as necessary.   Current Outpatient Medications   Medication Sig    Evolocumab (REPATHA SURECLICK) SOAJ pen Inject 1 mL into the skin every 14 days    disopyramide (NORPACE) 100 MG capsule Take 1 capsule by mouth 4 times daily    rosuvastatin (CRESTOR) 20 MG tablet Take 1 tablet by mouth daily    Semaglutide-Weight Management (WEGOVY) 0.25 MG/0.5ML SOAJ SC injection Inject 0.25 mg into the skin every 7 days    metoprolol succinate (TOPROL XL) 100 MG extended release tablet Take 1 tablet by mouth daily    amphetamine-dextroamphetamine (ADDERALL) 10 MG tablet Take 1 tablet by mouth daily.    dilTIAZem (CARDIZEM) 30 MG tablet Take 1 tablet by mouth in the morning and at bedtime 30 mg in AM and 30 mg PM    citalopram (CELEXA) 40 MG tablet Take 1 tablet by mouth daily    sildenafil (VIAGRA) 100 MG tablet Take 1 tablet by mouth as needed for Erectile Dysfunction    pantoprazole (PROTONIX) 40 MG tablet Take 1 tablet by mouth daily    tamsulosin (FLOMAX) 0.4 MG capsule Take 1 capsule by mouth daily    finasteride (PROSCAR) 5 MG tablet Take 1 tablet by mouth daily    aspirin 81 MG EC tablet Take 1 tablet by mouth daily    fexofenadine (ALLEGRA) 180 MG tablet Take by mouth as needed    fluticasone (FLONASE) 50 MCG/ACT nasal spray 2 sprays by Nasal route daily     No current facility-administered medications for this visit.        Allergies   Allergen Reactions    Codeine Nausea And Vomiting           PHYSICAL EXAM:     Wt Readings from Last 3

## 2025-05-23 ENCOUNTER — CLINICAL DOCUMENTATION (OUTPATIENT)
Age: 70
End: 2025-05-23

## 2025-05-23 NOTE — PROGRESS NOTES
[employer] PA submitted.   Prior authorization approved  Payer: [employer] - Medicare Case ID: [id number]    [phone]  Note from payer: [place] Case: [id number], Status: Approved, Coverage Starts on: 1/1/2025 12:00:00 AM, Coverage Ends on: 12/31/2025 12:00:00 AM. Questions? Contact [phone].

## 2025-05-27 ENCOUNTER — TELEPHONE (OUTPATIENT)
Age: 70
End: 2025-05-27

## 2025-05-27 NOTE — TELEPHONE ENCOUNTER
Verbal order received from Dr. Eastman to contact patient regarding Camzyos and start patient on Camzyos 5mg PO Daily. Orders read back and verified.  LM asking for patient to call me back at [phone].

## 2025-05-28 NOTE — TELEPHONE ENCOUNTER
Patient returned my call.  States Dr. Eastman discussed Nancy with him and he would like to proceed with starting the medication.  We discussed the REMS program and [facility] for patients and he states he would like to participate in that and he will come by Thursday morning to sign the forms.

## 2025-05-29 ENCOUNTER — CLINICAL DOCUMENTATION (OUTPATIENT)
Age: 70
End: 2025-05-29

## 2025-05-29 NOTE — PROGRESS NOTES
Patient came to office this morning to sign MyCzyos Program Enrollment paperwork.  He was given the CAMZYOS REMS patient brochure.  He was instructed to call me when he receives his first supply of the medication and let me know what day he started the medication.  I will schedule his first echo once I know the medication start day.  MyCzyos program enrollment form has been faxed to [phone].  Patient was encouraged to contact me with any questions or concerns regarding Camzyos.  He expressed understanding.

## 2025-06-19 ENCOUNTER — TELEPHONE (OUTPATIENT)
Age: 70
End: 2025-06-19

## 2025-06-19 NOTE — TELEPHONE ENCOUNTER
Patient called to let me know he was able to get financial assistance through a foundation to help with the cost of Camzyos.  They will ship his first 35 day Rx this coming Monday and he is supposed to receive it on Tuesday which is when he plans to take his first dose.  I scheduled his first follow up ECHO on 7/17/25@0830 and he will see Dr. Eastman @ 0900.  Pt. Instructed to contact me if something happens and he does not take his first dose on Tuesday.  He was encouraged to call with any questions or concerns.

## 2025-06-24 ENCOUNTER — TELEPHONE (OUTPATIENT)
Age: 70
End: 2025-06-24

## 2025-06-24 NOTE — TELEPHONE ENCOUNTER
Called patient to confirm that he received his Camzyos medication and the date of his first dose.  Asked him to call me back at [phone].

## 2025-06-25 ENCOUNTER — TELEPHONE (OUTPATIENT)
Age: 70
End: 2025-06-25

## 2025-06-25 RX ORDER — MAVACAMTEN 5 MG/1
5 CAPSULE, GELATIN COATED ORAL DAILY
COMMUNITY
Start: 2025-06-24

## 2025-06-25 NOTE — TELEPHONE ENCOUNTER
Patient called me back.  Patient instructed to stop taking diltiazem per Dr. Eastman.  He verbalized understanding.  Reminded that Echo is scheduled for 7/17/25 @ 8:30

## 2025-06-25 NOTE — TELEPHONE ENCOUNTER
Patient called to report that he started his Camzyos rx on 6/24/2025.  Patient also reported that he is taking Cardizem which is on drug interactions list for Camzyos.  Should he stop the Cardizem?  Patient states he has taken this for years.  Please advise.    Update:  I reached out to the [facility] call center who connected me with a [facility] pharmacist who said since the patient has been on this drug for a long time it is ok to start the drug at 5mg just needs to be monitored closely.  He is scheduled for an echo on 7/17 and a follow up visit with you that same day.

## 2025-06-25 NOTE — TELEPHONE ENCOUNTER
Called Dr. Eastman to discuss this with him.  Per Dr. Eastman patient is on diltiazem for the obstruction so the patient should stop the diltiazem since he has started the Camzyos.  Order read back and verified.  Will notify patient.

## 2025-07-14 ENCOUNTER — APPOINTMENT (OUTPATIENT)
Dept: URBAN - METROPOLITAN AREA CLINIC 329 | Facility: CLINIC | Age: 70
Setting detail: DERMATOLOGY
End: 2025-07-14

## 2025-07-14 DIAGNOSIS — D18.0 HEMANGIOMA: ICD-10-CM

## 2025-07-14 DIAGNOSIS — D22 MELANOCYTIC NEVI: ICD-10-CM

## 2025-07-14 DIAGNOSIS — L57.8 OTHER SKIN CHANGES DUE TO CHRONIC EXPOSURE TO NONIONIZING RADIATION: ICD-10-CM

## 2025-07-14 DIAGNOSIS — L82.1 OTHER SEBORRHEIC KERATOSIS: ICD-10-CM

## 2025-07-14 DIAGNOSIS — Z12.83 ENCOUNTER FOR SCREENING FOR MALIGNANT NEOPLASM OF SKIN: ICD-10-CM

## 2025-07-14 DIAGNOSIS — Z71.89 OTHER SPECIFIED COUNSELING: ICD-10-CM

## 2025-07-14 DIAGNOSIS — L81.4 OTHER MELANIN HYPERPIGMENTATION: ICD-10-CM

## 2025-07-14 PROBLEM — D22.72 MELANOCYTIC NEVI OF LEFT LOWER LIMB, INCLUDING HIP: Status: ACTIVE | Noted: 2025-07-14

## 2025-07-14 PROBLEM — D22.71 MELANOCYTIC NEVI OF RIGHT LOWER LIMB, INCLUDING HIP: Status: ACTIVE | Noted: 2025-07-14

## 2025-07-14 PROBLEM — D22.61 MELANOCYTIC NEVI OF RIGHT UPPER LIMB, INCLUDING SHOULDER: Status: ACTIVE | Noted: 2025-07-14

## 2025-07-14 PROBLEM — D22.5 MELANOCYTIC NEVI OF TRUNK: Status: ACTIVE | Noted: 2025-07-14

## 2025-07-14 PROBLEM — D18.01 HEMANGIOMA OF SKIN AND SUBCUTANEOUS TISSUE: Status: ACTIVE | Noted: 2025-07-14

## 2025-07-14 PROCEDURE — ? SUNSCREEN RECOMMENDATIONS

## 2025-07-14 PROCEDURE — ? COUNSELING

## 2025-07-14 PROCEDURE — ? TREATMENT REGIMEN

## 2025-07-14 PROCEDURE — ? FULL BODY SKIN EXAM

## 2025-07-14 ASSESSMENT — LOCATION DETAILED DESCRIPTION DERM
LOCATION DETAILED: LEFT DISTAL POSTERIOR THIGH
LOCATION DETAILED: LEFT ANTERIOR DISTAL UPPER ARM
LOCATION DETAILED: EPIGASTRIC SKIN
LOCATION DETAILED: RIGHT ANTERIOR DISTAL UPPER ARM
LOCATION DETAILED: RIGHT PROXIMAL DORSAL FOREARM
LOCATION DETAILED: RIGHT ANTERIOR DISTAL THIGH
LOCATION DETAILED: LEFT DISTAL CALF
LOCATION DETAILED: RIGHT CLAVICULAR SKIN
LOCATION DETAILED: RIGHT VENTRAL DISTAL FOREARM
LOCATION DETAILED: MID-FRONTAL SCALP
LOCATION DETAILED: LEFT PROXIMAL POSTERIOR THIGH
LOCATION DETAILED: RIGHT SUPERIOR MEDIAL UPPER BACK
LOCATION DETAILED: LEFT CENTRAL MALAR CHEEK
LOCATION DETAILED: SUPERIOR THORACIC SPINE
LOCATION DETAILED: UPPER STERNUM
LOCATION DETAILED: LEFT INFERIOR UPPER BACK

## 2025-07-14 ASSESSMENT — LOCATION SIMPLE DESCRIPTION DERM
LOCATION SIMPLE: LEFT UPPER ARM
LOCATION SIMPLE: ABDOMEN
LOCATION SIMPLE: RIGHT UPPER BACK
LOCATION SIMPLE: LEFT CALF
LOCATION SIMPLE: RIGHT CLAVICULAR SKIN
LOCATION SIMPLE: LEFT POSTERIOR THIGH
LOCATION SIMPLE: RIGHT THIGH
LOCATION SIMPLE: UPPER BACK
LOCATION SIMPLE: RIGHT FOREARM
LOCATION SIMPLE: LEFT CHEEK
LOCATION SIMPLE: ANTERIOR SCALP
LOCATION SIMPLE: LEFT UPPER BACK
LOCATION SIMPLE: CHEST
LOCATION SIMPLE: RIGHT UPPER ARM

## 2025-07-14 ASSESSMENT — LOCATION ZONE DERM
LOCATION ZONE: FACE
LOCATION ZONE: ARM
LOCATION ZONE: LEG
LOCATION ZONE: SCALP
LOCATION ZONE: TRUNK

## 2025-07-16 NOTE — PROGRESS NOTES
[facility] CARDIOLOGY  [address] [place] [address], [address]  [place] [address]  PHONE: [phone]        25        NAME:  Arthur Maradiaga  : 1955  MRN: [id number]     ASCVD, prior pci  HTN  HOCM, prior alcohol septal ablation  Thrombocytopenia  Hx Pud and GIB, 2013    CHIEF COMPLAINT:    Coronary Artery Disease      SUBJECTIVE:     Feeling  better.  No angina w/ pickle ball  Has held off on Wegovy.     Medications were all reviewed with the patient today and updated as necessary.   Current Outpatient Medications   Medication Sig    Mavacamten (CAMZYOS) 5 MG CAPS Take 5 mg by mouth daily    Evolocumab (REPATHA SURECLICK) SOAJ pen Inject 1 mL into the skin every 14 days    rosuvastatin (CRESTOR) 20 MG tablet Take 1 tablet by mouth daily    metoprolol succinate (TOPROL XL) 100 MG extended release tablet Take 1 tablet by mouth daily    amphetamine-dextroamphetamine (ADDERALL) 10 MG tablet Take 1 tablet by mouth daily.    citalopram (CELEXA) 40 MG tablet Take 1 tablet by mouth daily (Patient taking differently: Take 1 tablet by mouth as needed)    sildenafil (VIAGRA) 100 MG tablet Take 1 tablet by mouth as needed for Erectile Dysfunction    pantoprazole (PROTONIX) 40 MG tablet Take 1 tablet by mouth daily    tamsulosin (FLOMAX) 0.4 MG capsule Take 1 capsule by mouth daily    finasteride (PROSCAR) 5 MG tablet Take 1 tablet by mouth daily    aspirin 81 MG EC tablet Take 1 tablet by mouth daily    fexofenadine (ALLEGRA) 180 MG tablet Take by mouth as needed    fluticasone (FLONASE) 50 MCG/ACT nasal spray 2 sprays by Nasal route as needed    disopyramide (NORPACE) 100 MG capsule Take 1 capsule by mouth 4 times daily (Patient not taking: Reported on 2025)    Semaglutide-Weight Management (WEGOVY) 0.25 MG/0.5ML SOAJ SC injection Inject 0.25 mg into the skin every 7 days (Patient not taking: Reported on 2025)     No current facility-administered medications for this visit.        Allergies

## 2025-07-17 ENCOUNTER — OFFICE VISIT (OUTPATIENT)
Age: 70
End: 2025-07-17
Payer: MEDICARE

## 2025-07-17 VITALS
WEIGHT: 234 LBS | DIASTOLIC BLOOD PRESSURE: 76 MMHG | HEART RATE: 60 BPM | SYSTOLIC BLOOD PRESSURE: 136 MMHG | BODY MASS INDEX: 31.69 KG/M2 | HEIGHT: 72 IN

## 2025-07-17 DIAGNOSIS — I42.1 HYPERTROPHIC OBSTRUCTIVE CARDIOMYOPATHY (HCC): Primary | ICD-10-CM

## 2025-07-17 PROCEDURE — 1123F ACP DISCUSS/DSCN MKR DOCD: CPT | Performed by: INTERNAL MEDICINE

## 2025-07-17 PROCEDURE — 3017F COLORECTAL CA SCREEN DOC REV: CPT | Performed by: INTERNAL MEDICINE

## 2025-07-17 PROCEDURE — G8428 CUR MEDS NOT DOCUMENT: HCPCS | Performed by: INTERNAL MEDICINE

## 2025-07-17 PROCEDURE — 1036F TOBACCO NON-USER: CPT | Performed by: INTERNAL MEDICINE

## 2025-07-17 PROCEDURE — 99214 OFFICE O/P EST MOD 30 MIN: CPT | Performed by: INTERNAL MEDICINE

## 2025-07-17 PROCEDURE — G8417 CALC BMI ABV UP PARAM F/U: HCPCS | Performed by: INTERNAL MEDICINE

## 2025-07-17 PROCEDURE — 3075F SYST BP GE 130 - 139MM HG: CPT | Performed by: INTERNAL MEDICINE

## 2025-07-17 PROCEDURE — 3078F DIAST BP <80 MM HG: CPT | Performed by: INTERNAL MEDICINE

## 2025-07-17 PROCEDURE — 1126F AMNT PAIN NOTED NONE PRSNT: CPT | Performed by: INTERNAL MEDICINE

## 2025-07-21 ENCOUNTER — TELEPHONE (OUTPATIENT)
Age: 70
End: 2025-07-21

## 2025-07-21 NOTE — TELEPHONE ENCOUNTER
Returned patient's call.  Asked him to call me back at [phone].  Patient returned call.  Echo and MD appointments reviewed.  Patient had no further concerns.

## 2025-07-22 ENCOUNTER — PATIENT MESSAGE (OUTPATIENT)
Age: 70
End: 2025-07-22

## 2025-07-22 NOTE — TELEPHONE ENCOUNTER
Called patient and left a message with new appointment date and time for echo and office visit with Dr. Eastman.  I also sent a D square nvt message to the patient.

## 2025-07-31 ENCOUNTER — TELEPHONE (OUTPATIENT)
Age: 70
End: 2025-07-31

## 2025-07-31 DIAGNOSIS — I42.1 HYPERTROPHIC OBSTRUCTIVE CARDIOMYOPATHY (HCC): Primary | ICD-10-CM

## 2025-07-31 RX ORDER — MAVACAMTEN 5 MG/1
5 CAPSULE, GELATIN COATED ORAL DAILY
Qty: 35 CAPSULE | Refills: 0 | Status: SHIPPED | OUTPATIENT
Start: 2025-07-31 | End: 2025-07-31

## 2025-08-12 ENCOUNTER — PATIENT MESSAGE (OUTPATIENT)
Age: 70
End: 2025-08-12

## 2025-08-12 ENCOUNTER — CLINICAL DOCUMENTATION (OUTPATIENT)
Age: 70
End: 2025-08-12

## 2025-08-12 ENCOUNTER — OFFICE VISIT (OUTPATIENT)
Age: 70
End: 2025-08-12
Payer: MEDICARE

## 2025-08-12 VITALS
HEART RATE: 54 BPM | WEIGHT: 233 LBS | DIASTOLIC BLOOD PRESSURE: 74 MMHG | SYSTOLIC BLOOD PRESSURE: 132 MMHG | BODY MASS INDEX: 31.56 KG/M2 | HEIGHT: 72 IN

## 2025-08-12 DIAGNOSIS — I42.1 HYPERTROPHIC OBSTRUCTIVE CARDIOMYOPATHY (HCC): ICD-10-CM

## 2025-08-12 DIAGNOSIS — I25.10 ASCVD (ARTERIOSCLEROTIC CARDIOVASCULAR DISEASE): Primary | ICD-10-CM

## 2025-08-12 PROCEDURE — 1123F ACP DISCUSS/DSCN MKR DOCD: CPT | Performed by: INTERNAL MEDICINE

## 2025-08-12 PROCEDURE — G8428 CUR MEDS NOT DOCUMENT: HCPCS | Performed by: INTERNAL MEDICINE

## 2025-08-12 PROCEDURE — 3075F SYST BP GE 130 - 139MM HG: CPT | Performed by: INTERNAL MEDICINE

## 2025-08-12 PROCEDURE — 3078F DIAST BP <80 MM HG: CPT | Performed by: INTERNAL MEDICINE

## 2025-08-12 PROCEDURE — 93000 ELECTROCARDIOGRAM COMPLETE: CPT | Performed by: INTERNAL MEDICINE

## 2025-08-12 PROCEDURE — 99214 OFFICE O/P EST MOD 30 MIN: CPT | Performed by: INTERNAL MEDICINE

## 2025-08-12 PROCEDURE — 1126F AMNT PAIN NOTED NONE PRSNT: CPT | Performed by: INTERNAL MEDICINE

## 2025-08-12 PROCEDURE — 3017F COLORECTAL CA SCREEN DOC REV: CPT | Performed by: INTERNAL MEDICINE

## 2025-08-12 PROCEDURE — 1036F TOBACCO NON-USER: CPT | Performed by: INTERNAL MEDICINE

## 2025-08-12 PROCEDURE — G8417 CALC BMI ABV UP PARAM F/U: HCPCS | Performed by: INTERNAL MEDICINE
